# Patient Record
Sex: FEMALE | Race: ASIAN | URBAN - METROPOLITAN AREA
[De-identification: names, ages, dates, MRNs, and addresses within clinical notes are randomized per-mention and may not be internally consistent; named-entity substitution may affect disease eponyms.]

---

## 2018-09-20 PROBLEM — E78.5 DYSLIPIDEMIA: Status: ACTIVE | Noted: 2017-10-04

## 2018-09-20 PROBLEM — I44.7 LBBB (LEFT BUNDLE BRANCH BLOCK): Status: ACTIVE | Noted: 2017-11-06

## 2019-01-17 PROBLEM — B37.31 CANDIDA VAGINITIS: Status: ACTIVE | Noted: 2019-01-17

## 2019-01-17 PROBLEM — B37.3 CANDIDA VAGINITIS: Status: ACTIVE | Noted: 2019-01-17

## 2019-01-17 PROBLEM — E78.2 MIXED HYPERLIPIDEMIA: Status: ACTIVE | Noted: 2019-01-17

## 2019-01-17 PROBLEM — E11.9 CONTROLLED TYPE 2 DIABETES MELLITUS WITHOUT COMPLICATION, WITHOUT LONG-TERM CURRENT USE OF INSULIN (HCC): Status: ACTIVE | Noted: 2019-01-17

## 2020-03-16 ENCOUNTER — TELEPHONE (OUTPATIENT)
Dept: FAMILY MEDICINE CLINIC | Facility: CLINIC | Age: 65
End: 2020-03-16

## 2020-03-16 NOTE — TELEPHONE ENCOUNTER
Patient returned call not happy that she has to wait a month to be seen. Patient will need medication and would like to know if she can somehow get in sooner so that she is not without her medication.

## 2020-03-16 NOTE — TELEPHONE ENCOUNTER
Patient has never been seen here. Was scheduled for DM/thyroid/HTN visit/establish care. We are unable to refill her medication until she establishes with office.      Routed to clinical supervisor, Gerda Green RN

## 2022-09-07 ENCOUNTER — OFFICE VISIT (OUTPATIENT)
Dept: UROLOGY | Facility: CLINIC | Age: 67
End: 2022-09-07
Attending: OBSTETRICS & GYNECOLOGY
Payer: MEDICARE

## 2022-09-07 VITALS — HEIGHT: 66 IN | BODY MASS INDEX: 23.78 KG/M2 | TEMPERATURE: 97 F | WEIGHT: 148 LBS

## 2022-09-07 DIAGNOSIS — N81.6 RECTOCELE: ICD-10-CM

## 2022-09-07 DIAGNOSIS — N81.2 INCOMPLETE UTEROVAGINAL PROLAPSE: Primary | ICD-10-CM

## 2022-09-07 DIAGNOSIS — N95.2 VAGINAL ATROPHY: ICD-10-CM

## 2022-09-07 DIAGNOSIS — N81.11 CYSTOCELE, MIDLINE: ICD-10-CM

## 2022-09-07 DIAGNOSIS — N81.89 PELVIC FLOOR WEAKNESS: ICD-10-CM

## 2022-09-07 PROCEDURE — 99202 OFFICE O/P NEW SF 15 MIN: CPT

## 2022-09-19 ENCOUNTER — HOSPITAL ENCOUNTER (OUTPATIENT)
Dept: ULTRASOUND IMAGING | Age: 67
Discharge: HOME | End: 2022-09-19
Attending: INTERNAL MEDICINE

## 2022-09-19 ENCOUNTER — HOSPITAL ENCOUNTER (OUTPATIENT)
Dept: MAMMOGRAPHY | Age: 67
Discharge: HOME | End: 2022-09-19
Attending: INTERNAL MEDICINE

## 2022-09-19 DIAGNOSIS — N64.4 BREAST PAIN, LEFT: ICD-10-CM

## 2022-09-19 DIAGNOSIS — Z12.31 SCREENING MAMMOGRAM FOR BREAST CANCER: ICD-10-CM

## 2022-09-19 PROCEDURE — 77061 BREAST TOMOSYNTHESIS UNI: CPT | Performed by: INTERNAL MEDICINE

## 2022-09-19 PROCEDURE — 76642 ULTRASOUND BREAST LIMITED: CPT | Performed by: INTERNAL MEDICINE

## 2022-09-19 PROCEDURE — 77065 DX MAMMO INCL CAD UNI: CPT | Performed by: INTERNAL MEDICINE

## 2022-11-09 ENCOUNTER — TELEPHONE (OUTPATIENT)
Dept: UROLOGY | Facility: CLINIC | Age: 67
End: 2022-11-09

## 2022-11-09 NOTE — TELEPHONE ENCOUNTER
TC from pt requesting diagnostic/billing codes for upcoming UDS   Pt is switching insurance 1/1/23 to [facility] and wants to see if procedure coverage is better with insurance change.   Has UDS scheduled 11/16, will call to reschedule if she determines coverage is better otherwise will keep apt

## 2022-11-16 ENCOUNTER — OFFICE VISIT (OUTPATIENT)
Dept: UROLOGY | Facility: CLINIC | Age: 67
End: 2022-11-16
Attending: OBSTETRICS & GYNECOLOGY
Payer: MEDICARE

## 2022-11-16 VITALS — TEMPERATURE: 98 F | WEIGHT: 148 LBS | BODY MASS INDEX: 24 KG/M2 | RESPIRATION RATE: 16 BRPM

## 2022-11-16 DIAGNOSIS — N81.11 CYSTOCELE, MIDLINE: ICD-10-CM

## 2022-11-16 DIAGNOSIS — N81.6 RECTOCELE: ICD-10-CM

## 2022-11-16 DIAGNOSIS — N81.2 INCOMPLETE UTEROVAGINAL PROLAPSE: Primary | ICD-10-CM

## 2022-11-16 LAB
BLOOD URINE: NEGATIVE
CONTROL RUN WITHIN 24 HOURS?: YES
LEUKOCYTE ESTERASE URINE: NEGATIVE
NITRITE URINE: NEGATIVE

## 2022-11-16 PROCEDURE — 51784 ANAL/URINARY MUSCLE STUDY: CPT

## 2022-11-16 PROCEDURE — 51797 INTRAABDOMINAL PRESSURE TEST: CPT

## 2022-11-16 PROCEDURE — 51729 CYSTOMETROGRAM W/VP&UP: CPT

## 2022-11-16 PROCEDURE — 51741 ELECTRO-UROFLOWMETRY FIRST: CPT

## 2022-11-16 PROCEDURE — 81002 URINALYSIS NONAUTO W/O SCOPE: CPT

## 2022-11-16 NOTE — PATIENT INSTRUCTIONS
[facility] UROGYNECOLOGY  [address]  [address]: [phone]  FAX: [phone]       Urodynamic Testing Discharge Instructions: There are NO dietary or activity restrictions. You may resume your normal schedule. You may have mild discomfort for a few hours after your testing today. There may be some mild burning when you urinate or you may see some blood in your urine. These problems should not last more than 24 hours. The following suggestions may minimize any symptoms you experience. Drink 6-8 large glasses of water over the next 8 hours  A compress or sitz bath may be soothing  Tylenol or Ibuprofen may be taken as needed    If you experience any of the following, please call the office or, if after hours, the on-call physician at [phone]. Excessive pain  Bright red bloody discharge  Fever or chills  Continued urgency, frequency or burning with urination    Obtaining Test Results    Your urodynamic test will be interpreted by a specialist and available to the referring physician within 7-14 days. Patients in our clinic are given an appointment to come back to discuss the results and any appropriate treatment recommendations. Please do not hesitate to contact our office with any questions or concerns at [phone]. I acknowledge that I have received verbal and written discharge  instructions and that I understand these instructions clearly. Patient Signature:    Date:    [address]     Post-Operative Guidelines    AVOID CONSTIPATION - Take Miralax: one capful in water or juice each morning. You can also take each evening if needed. No lifting over 10 lbs. (1 gallon of milk is 8 lbs. )  It is okay to walk up and down stairs and to walk outside, but be careful not to become fatigued. Showers and tub baths are okay, even if you have a catheter or abdominal incision.   You may ride in a car immediately. You may drive after 1-2 weeks. Please call us for any of the following:  Temperature above 100.5 for 4 hours or above 101.0 at any time  Chest pain or trouble breathing  Vaginal bleeding heavier than a period  Redness, tenderness or swelling of your legs  Pain or burning when you urinate  Redness, pain or foul discharge from incision          [facility]    What is a urinary catheter? A catheter is a soft tube used to drain urine from your bladder. Why do I need a catheter? A urinary catheter is used to help with healing immediately after surgery. It works to keep your bladder empty while you are recovering. Surgery, swelling and anesthesia can cause the bladder to lose its normal sensations for a while. The catheter may stay in place for a week or more after you go home. Where will I go to get the catheter removed? Your catheter will be removed in the office. Please call the office to schedule a voiding trial with the nurse. How will the catheter be removed? A nurse will disconnect your catheter from the drainage bag. She will attach a syringe to the catheter and fill the bladder with sterile water until you have a strong desire to urinate. The catheter will be removed and you will be able to go to the bathroom to empty your bladder. Will the catheter need to be replaced? You will need to urinate a specific amount, depending on how much you were initially able to hold. Your catheter will only need to be replaced if you are not able to empty the specific amount. If the catheter is replaced, your nurse will discuss with you when you need to return. What do I do after the catheter is removed? For the first 24 hours, you should go to the bathroom with your first urge or every 2-3 hours while you are awake.   Urinate before you go to bed and if you wake up in the night, you do not need to set an alarm to wake up. Drink plenty of water (6-8 glasses) slowly throughout the day. Avoid liquids containing caffeine. Continue with any pain medications (Motrin) as directed by the nurse. Continue with your current bowel regime; avoid constipation. What if I have trouble emptying my bladder? If you are having trouble emptying your bladder, try the following tips:  Urinate normally then stand up, walk around for a minute or two, sit back down on the commode and attempt to urinate (double void). Sit in a tub of warm water and try to urinate in the tub. Run warm water over your vaginal area while attempting to urinate. When should I call the office? If you are unable to urinate for 6 hours. You feel you need to urinate but cannot. Urinating frequently in small amounts. You experience abdominal bloating, pressure or back pain. You have a fever over 100.5 for 4 hours or above 101.0 anytime. You have nausea and/or vomiting. Burning/pain with urination. Please feel free to call the nurse at [phone] with any questions 8am-4:30pm Monday-Friday. If the office is closed, you may call the on call physician at [phone] or go to the emergency room.

## 2022-11-30 ENCOUNTER — OFFICE VISIT (OUTPATIENT)
Dept: UROLOGY | Facility: CLINIC | Age: 67
End: 2022-11-30
Attending: OBSTETRICS & GYNECOLOGY
Payer: MEDICARE

## 2022-11-30 VITALS — HEIGHT: 66 IN | WEIGHT: 148 LBS | TEMPERATURE: 97 F | BODY MASS INDEX: 23.78 KG/M2

## 2022-11-30 DIAGNOSIS — N39.3 URINARY, INCONTINENCE, STRESS FEMALE: ICD-10-CM

## 2022-11-30 DIAGNOSIS — N81.6 RECTOCELE: ICD-10-CM

## 2022-11-30 DIAGNOSIS — N81.11 CYSTOCELE, MIDLINE: ICD-10-CM

## 2022-11-30 DIAGNOSIS — N81.2 INCOMPLETE UTEROVAGINAL PROLAPSE: Primary | ICD-10-CM

## 2022-11-30 PROCEDURE — 99212 OFFICE O/P EST SF 10 MIN: CPT

## 2023-01-11 ENCOUNTER — TELEPHONE (OUTPATIENT)
Dept: UROLOGY | Facility: CLINIC | Age: 68
End: 2023-01-11

## 2023-01-11 NOTE — TELEPHONE ENCOUNTER
TC from pt who has inquired about upcoming (1/26/23) TVH, BS, USLS, APER with mus/cysto. Pt wants to know why her ovaries are not being removed and has requested this RN to send message to Dr Nadir Ye for clarification. Discussed benefits of keeping ovaries, for coronary health but pt adamant she'd like them removed as well. Also inquiring about possibility of home health care for zhao care. Discussed what her expectations should be postoperatively and reassured her that RN staff at hospital will review zhao care after surgery with herself and spouse.   No further questions at this time  Referenced surgical packet for bowel regimen and overall home care

## 2023-01-13 ENCOUNTER — LAB ENCOUNTER (OUTPATIENT)
Dept: LAB | Age: 68
End: 2023-01-13
Attending: INTERNAL MEDICINE
Payer: MEDICARE

## 2023-01-13 ENCOUNTER — EKG ENCOUNTER (OUTPATIENT)
Dept: LAB | Age: 68
End: 2023-01-13
Attending: INTERNAL MEDICINE
Payer: MEDICARE

## 2023-01-13 DIAGNOSIS — Z01.818 PREOPERATIVE EXAMINATION, UNSPECIFIED: ICD-10-CM

## 2023-01-13 LAB
ALBUMIN SERPL-MCNC: 3.8 G/DL (ref 3.4–5)
ALBUMIN/GLOB SERPL: 1 {RATIO} (ref 1–2)
ALP LIVER SERPL-CCNC: 61 U/L
ALT SERPL-CCNC: 20 U/L
ANION GAP SERPL CALC-SCNC: 3 MMOL/L (ref 0–18)
AST SERPL-CCNC: 14 U/L (ref 15–37)
ATRIAL RATE: 52 BPM
BASOPHILS # BLD AUTO: 0.05 X10(3) UL (ref 0–0.2)
BASOPHILS NFR BLD AUTO: 0.8 %
BILIRUB SERPL-MCNC: 0.3 MG/DL (ref 0.1–2)
BUN BLD-MCNC: 17 MG/DL (ref 7–18)
CALCIUM BLD-MCNC: 9.6 MG/DL (ref 8.5–10.1)
CHLORIDE SERPL-SCNC: 108 MMOL/L (ref 98–112)
CO2 SERPL-SCNC: 29 MMOL/L (ref 21–32)
CREAT BLD-MCNC: 0.56 MG/DL
EOSINOPHIL # BLD AUTO: 0.24 X10(3) UL (ref 0–0.7)
EOSINOPHIL NFR BLD AUTO: 3.7 %
ERYTHROCYTE [DISTWIDTH] IN BLOOD BY AUTOMATED COUNT: 12.7 %
FASTING STATUS PATIENT QL REPORTED: NO
GFR SERPLBLD BASED ON 1.73 SQ M-ARVRAT: 100 ML/MIN/1.73M2 (ref 60–?)
GLOBULIN PLAS-MCNC: 3.9 G/DL (ref 2.8–4.4)
GLUCOSE BLD-MCNC: 116 MG/DL (ref 70–99)
HCT VFR BLD AUTO: 40.7 %
HGB BLD-MCNC: 13.5 G/DL
IMM GRANULOCYTES # BLD AUTO: 0.02 X10(3) UL (ref 0–1)
IMM GRANULOCYTES NFR BLD: 0.3 %
LYMPHOCYTES # BLD AUTO: 2.39 X10(3) UL (ref 1–4)
LYMPHOCYTES NFR BLD AUTO: 36.6 %
MCH RBC QN AUTO: 30.7 PG (ref 26–34)
MCHC RBC AUTO-ENTMCNC: 33.2 G/DL (ref 31–37)
MCV RBC AUTO: 92.5 FL
MONOCYTES # BLD AUTO: 0.7 X10(3) UL (ref 0.1–1)
MONOCYTES NFR BLD AUTO: 10.7 %
NEUTROPHILS # BLD AUTO: 3.13 X10 (3) UL (ref 1.5–7.7)
NEUTROPHILS # BLD AUTO: 3.13 X10(3) UL (ref 1.5–7.7)
NEUTROPHILS NFR BLD AUTO: 47.9 %
OSMOLALITY SERPL CALC.SUM OF ELEC: 293 MOSM/KG (ref 275–295)
P AXIS: 38 DEGREES
P-R INTERVAL: 178 MS
PLATELET # BLD AUTO: 365 10(3)UL (ref 150–450)
POTASSIUM SERPL-SCNC: 4.9 MMOL/L (ref 3.5–5.1)
PROT SERPL-MCNC: 7.7 G/DL (ref 6.4–8.2)
Q-T INTERVAL: 482 MS
QRS DURATION: 138 MS
QTC CALCULATION (BEZET): 448 MS
R AXIS: -31 DEGREES
RBC # BLD AUTO: 4.4 X10(6)UL
SODIUM SERPL-SCNC: 140 MMOL/L (ref 136–145)
T AXIS: 83 DEGREES
VENTRICULAR RATE: 52 BPM
WBC # BLD AUTO: 6.5 X10(3) UL (ref 4–11)

## 2023-01-13 PROCEDURE — 80053 COMPREHEN METABOLIC PANEL: CPT

## 2023-01-13 PROCEDURE — 93010 ELECTROCARDIOGRAM REPORT: CPT | Performed by: INTERNAL MEDICINE

## 2023-01-13 PROCEDURE — 93005 ELECTROCARDIOGRAM TRACING: CPT

## 2023-01-13 PROCEDURE — 85025 COMPLETE CBC W/AUTO DIFF WBC: CPT

## 2023-01-13 PROCEDURE — 36415 COLL VENOUS BLD VENIPUNCTURE: CPT

## 2023-01-13 NOTE — PROGRESS NOTES
I will discuss results with the patient in person at the next visit- Appt scheduled - date verified by me.  [place],

## 2023-01-23 ENCOUNTER — LAB ENCOUNTER (OUTPATIENT)
Dept: LAB | Age: 68
End: 2023-01-23
Attending: OBSTETRICS & GYNECOLOGY
Payer: MEDICARE

## 2023-01-23 DIAGNOSIS — Z01.818 PRE-OP TESTING: ICD-10-CM

## 2023-01-24 LAB — SARS-COV-2 RNA RESP QL NAA+PROBE: NOT DETECTED

## 2023-01-25 ENCOUNTER — ANESTHESIA EVENT (OUTPATIENT)
Dept: SURGERY | Facility: HOSPITAL | Age: 68
End: 2023-01-25
Payer: MEDICARE

## 2023-01-26 ENCOUNTER — HOSPITAL ENCOUNTER (OUTPATIENT)
Facility: HOSPITAL | Age: 68
Discharge: HOME HEALTH | End: 2023-01-27
Attending: OBSTETRICS & GYNECOLOGY | Admitting: OBSTETRICS & GYNECOLOGY
Payer: MEDICARE

## 2023-01-26 ENCOUNTER — ANESTHESIA (OUTPATIENT)
Dept: SURGERY | Facility: HOSPITAL | Age: 68
End: 2023-01-26
Payer: MEDICARE

## 2023-01-26 DIAGNOSIS — N81.4 UTEROVAGINAL PROLAPSE: ICD-10-CM

## 2023-01-26 DIAGNOSIS — N39.3 STRESS INCONTINENCE: ICD-10-CM

## 2023-01-26 DIAGNOSIS — Z01.818 PRE-OP TESTING: Primary | ICD-10-CM

## 2023-01-26 DIAGNOSIS — N81.1 CYSTOCELE: ICD-10-CM

## 2023-01-26 DIAGNOSIS — N81.6 RECTOCELE: ICD-10-CM

## 2023-01-26 DIAGNOSIS — D25.9 UTERINE FIBROID: ICD-10-CM

## 2023-01-26 DIAGNOSIS — IMO0002 CYSTOCELE: ICD-10-CM

## 2023-01-26 LAB
GLUCOSE BLD-MCNC: 119 MG/DL (ref 70–99)
GLUCOSE BLD-MCNC: 205 MG/DL (ref 70–99)
GLUCOSE BLD-MCNC: 207 MG/DL (ref 70–99)
GLUCOSE BLD-MCNC: 225 MG/DL (ref 70–99)

## 2023-01-26 PROCEDURE — 0UT97ZZ RESECTION OF UTERUS, VIA NATURAL OR ARTIFICIAL OPENING: ICD-10-PCS | Performed by: OBSTETRICS & GYNECOLOGY

## 2023-01-26 PROCEDURE — 0JQC0ZZ REPAIR PELVIC REGION SUBCUTANEOUS TISSUE AND FASCIA, OPEN APPROACH: ICD-10-PCS | Performed by: OBSTETRICS & GYNECOLOGY

## 2023-01-26 PROCEDURE — 93010 ELECTROCARDIOGRAM REPORT: CPT | Performed by: INTERNAL MEDICINE

## 2023-01-26 PROCEDURE — 88305 TISSUE EXAM BY PATHOLOGIST: CPT | Performed by: OBSTETRICS & GYNECOLOGY

## 2023-01-26 PROCEDURE — 93005 ELECTROCARDIOGRAM TRACING: CPT

## 2023-01-26 PROCEDURE — 0UQF0ZZ REPAIR CUL-DE-SAC, OPEN APPROACH: ICD-10-PCS | Performed by: OBSTETRICS & GYNECOLOGY

## 2023-01-26 PROCEDURE — 0UT27ZZ RESECTION OF BILATERAL OVARIES, VIA NATURAL OR ARTIFICIAL OPENING: ICD-10-PCS | Performed by: OBSTETRICS & GYNECOLOGY

## 2023-01-26 PROCEDURE — 0TSD0ZZ REPOSITION URETHRA, OPEN APPROACH: ICD-10-PCS | Performed by: OBSTETRICS & GYNECOLOGY

## 2023-01-26 PROCEDURE — 0UT77ZZ RESECTION OF BILATERAL FALLOPIAN TUBES, VIA NATURAL OR ARTIFICIAL OPENING: ICD-10-PCS | Performed by: OBSTETRICS & GYNECOLOGY

## 2023-01-26 PROCEDURE — 82962 GLUCOSE BLOOD TEST: CPT

## 2023-01-26 DEVICE — TRANSVAGINAL MID-URETHRAL SLING
Type: IMPLANTABLE DEVICE | Site: URETHRA | Status: FUNCTIONAL
Brand: ADVANTAGE FIT™  SYSTEM

## 2023-01-26 RX ORDER — LEVOTHYROXINE SODIUM 112 UG/1
112 TABLET ORAL
COMMUNITY

## 2023-01-26 RX ORDER — NEOSTIGMINE METHYLSULFATE 1 MG/ML
INJECTION, SOLUTION INTRAVENOUS AS NEEDED
Status: DISCONTINUED | OUTPATIENT
Start: 2023-01-26 | End: 2023-01-26 | Stop reason: SURG

## 2023-01-26 RX ORDER — KETOROLAC TROMETHAMINE 30 MG/ML
INJECTION, SOLUTION INTRAMUSCULAR; INTRAVENOUS AS NEEDED
Status: DISCONTINUED | OUTPATIENT
Start: 2023-01-26 | End: 2023-01-26 | Stop reason: SURG

## 2023-01-26 RX ORDER — KETOROLAC TROMETHAMINE 15 MG/ML
15 INJECTION, SOLUTION INTRAMUSCULAR; INTRAVENOUS EVERY 6 HOURS
Status: COMPLETED | OUTPATIENT
Start: 2023-01-26 | End: 2023-01-27

## 2023-01-26 RX ORDER — OXYCODONE HYDROCHLORIDE 5 MG/1
2.5 TABLET ORAL EVERY 4 HOURS PRN
Status: DISCONTINUED | OUTPATIENT
Start: 2023-01-26 | End: 2023-01-27

## 2023-01-26 RX ORDER — ACETAMINOPHEN 500 MG
1000 TABLET ORAL ONCE AS NEEDED
Status: DISCONTINUED | OUTPATIENT
Start: 2023-01-26 | End: 2023-01-26 | Stop reason: HOSPADM

## 2023-01-26 RX ORDER — ROCURONIUM BROMIDE 10 MG/ML
INJECTION, SOLUTION INTRAVENOUS AS NEEDED
Status: DISCONTINUED | OUTPATIENT
Start: 2023-01-26 | End: 2023-01-26 | Stop reason: SURG

## 2023-01-26 RX ORDER — NICOTINE POLACRILEX 4 MG
15 LOZENGE BUCCAL
Status: DISCONTINUED | OUTPATIENT
Start: 2023-01-26 | End: 2023-01-26 | Stop reason: HOSPADM

## 2023-01-26 RX ORDER — DEXTROSE MONOHYDRATE 25 G/50ML
50 INJECTION, SOLUTION INTRAVENOUS
Status: DISCONTINUED | OUTPATIENT
Start: 2023-01-26 | End: 2023-01-27

## 2023-01-26 RX ORDER — BUPIVACAINE HYDROCHLORIDE AND EPINEPHRINE 2.5; 5 MG/ML; UG/ML
INJECTION, SOLUTION EPIDURAL; INFILTRATION; INTRACAUDAL; PERINEURAL AS NEEDED
Status: DISCONTINUED | OUTPATIENT
Start: 2023-01-26 | End: 2023-01-26 | Stop reason: HOSPADM

## 2023-01-26 RX ORDER — LOSARTAN POTASSIUM 100 MG/1
100 TABLET ORAL DAILY
Status: DISCONTINUED | OUTPATIENT
Start: 2023-01-27 | End: 2023-01-27

## 2023-01-26 RX ORDER — HYDROCHLOROTHIAZIDE 25 MG/1
25 TABLET ORAL DAILY
COMMUNITY

## 2023-01-26 RX ORDER — HYDROMORPHONE HYDROCHLORIDE 1 MG/ML
0.4 INJECTION, SOLUTION INTRAMUSCULAR; INTRAVENOUS; SUBCUTANEOUS EVERY 2 HOUR PRN
Status: DISCONTINUED | OUTPATIENT
Start: 2023-01-26 | End: 2023-01-27

## 2023-01-26 RX ORDER — HYDROMORPHONE HYDROCHLORIDE 1 MG/ML
0.2 INJECTION, SOLUTION INTRAMUSCULAR; INTRAVENOUS; SUBCUTANEOUS EVERY 5 MIN PRN
Status: DISCONTINUED | OUTPATIENT
Start: 2023-01-26 | End: 2023-01-26 | Stop reason: HOSPADM

## 2023-01-26 RX ORDER — CEFAZOLIN SODIUM/WATER 2 G/20 ML
2 SYRINGE (ML) INTRAVENOUS ONCE
Status: COMPLETED | OUTPATIENT
Start: 2023-01-26 | End: 2023-01-26

## 2023-01-26 RX ORDER — HYDROMORPHONE HYDROCHLORIDE 1 MG/ML
0.6 INJECTION, SOLUTION INTRAMUSCULAR; INTRAVENOUS; SUBCUTANEOUS EVERY 5 MIN PRN
Status: DISCONTINUED | OUTPATIENT
Start: 2023-01-26 | End: 2023-01-26 | Stop reason: HOSPADM

## 2023-01-26 RX ORDER — ENOXAPARIN SODIUM 100 MG/ML
40 INJECTION SUBCUTANEOUS DAILY
Status: DISCONTINUED | OUTPATIENT
Start: 2023-01-27 | End: 2023-01-27

## 2023-01-26 RX ORDER — SODIUM CHLORIDE, SODIUM LACTATE, POTASSIUM CHLORIDE, CALCIUM CHLORIDE 600; 310; 30; 20 MG/100ML; MG/100ML; MG/100ML; MG/100ML
INJECTION, SOLUTION INTRAVENOUS CONTINUOUS
Status: DISCONTINUED | OUTPATIENT
Start: 2023-01-26 | End: 2023-01-27

## 2023-01-26 RX ORDER — IBUPROFEN 600 MG/1
600 TABLET ORAL EVERY 6 HOURS SCHEDULED
Status: DISCONTINUED | OUTPATIENT
Start: 2023-01-27 | End: 2023-01-27

## 2023-01-26 RX ORDER — GLYCOPYRROLATE 0.2 MG/ML
INJECTION, SOLUTION INTRAMUSCULAR; INTRAVENOUS AS NEEDED
Status: DISCONTINUED | OUTPATIENT
Start: 2023-01-26 | End: 2023-01-26 | Stop reason: SURG

## 2023-01-26 RX ORDER — NICOTINE POLACRILEX 4 MG
30 LOZENGE BUCCAL
Status: DISCONTINUED | OUTPATIENT
Start: 2023-01-26 | End: 2023-01-26 | Stop reason: HOSPADM

## 2023-01-26 RX ORDER — ONDANSETRON 2 MG/ML
INJECTION INTRAMUSCULAR; INTRAVENOUS AS NEEDED
Status: DISCONTINUED | OUTPATIENT
Start: 2023-01-26 | End: 2023-01-26 | Stop reason: SURG

## 2023-01-26 RX ORDER — MEPERIDINE HYDROCHLORIDE 25 MG/ML
25 INJECTION INTRAMUSCULAR; INTRAVENOUS; SUBCUTANEOUS
Status: DISCONTINUED | OUTPATIENT
Start: 2023-01-26 | End: 2023-01-26 | Stop reason: HOSPADM

## 2023-01-26 RX ORDER — HYDROCODONE BITARTRATE AND ACETAMINOPHEN 5; 325 MG/1; MG/1
2 TABLET ORAL ONCE AS NEEDED
Status: DISCONTINUED | OUTPATIENT
Start: 2023-01-26 | End: 2023-01-26 | Stop reason: HOSPADM

## 2023-01-26 RX ORDER — LEVOTHYROXINE SODIUM 112 UG/1
112 TABLET ORAL
Status: DISCONTINUED | OUTPATIENT
Start: 2023-01-27 | End: 2023-01-27

## 2023-01-26 RX ORDER — NICOTINE POLACRILEX 4 MG
15 LOZENGE BUCCAL
Status: DISCONTINUED | OUTPATIENT
Start: 2023-01-26 | End: 2023-01-27

## 2023-01-26 RX ORDER — ONDANSETRON 2 MG/ML
4 INJECTION INTRAMUSCULAR; INTRAVENOUS EVERY 8 HOURS PRN
Status: DISCONTINUED | OUTPATIENT
Start: 2023-01-26 | End: 2023-01-27

## 2023-01-26 RX ORDER — DEXTROSE MONOHYDRATE 25 G/50ML
50 INJECTION, SOLUTION INTRAVENOUS
Status: DISCONTINUED | OUTPATIENT
Start: 2023-01-26 | End: 2023-01-26 | Stop reason: HOSPADM

## 2023-01-26 RX ORDER — DIPHENHYDRAMINE HYDROCHLORIDE 50 MG/ML
12.5 INJECTION INTRAMUSCULAR; INTRAVENOUS EVERY 4 HOURS PRN
Status: DISCONTINUED | OUTPATIENT
Start: 2023-01-26 | End: 2023-01-27

## 2023-01-26 RX ORDER — OXYCODONE HYDROCHLORIDE 5 MG/1
5 TABLET ORAL EVERY 4 HOURS PRN
Status: DISCONTINUED | OUTPATIENT
Start: 2023-01-26 | End: 2023-01-27

## 2023-01-26 RX ORDER — HYDROMORPHONE HYDROCHLORIDE 1 MG/ML
INJECTION, SOLUTION INTRAMUSCULAR; INTRAVENOUS; SUBCUTANEOUS
Status: COMPLETED
Start: 2023-01-26 | End: 2023-01-26

## 2023-01-26 RX ORDER — NALOXONE HYDROCHLORIDE 0.4 MG/ML
80 INJECTION, SOLUTION INTRAMUSCULAR; INTRAVENOUS; SUBCUTANEOUS AS NEEDED
Status: DISCONTINUED | OUTPATIENT
Start: 2023-01-26 | End: 2023-01-26 | Stop reason: HOSPADM

## 2023-01-26 RX ORDER — NICOTINE POLACRILEX 4 MG
30 LOZENGE BUCCAL
Status: DISCONTINUED | OUTPATIENT
Start: 2023-01-26 | End: 2023-01-27

## 2023-01-26 RX ORDER — ONDANSETRON 4 MG/1
4 TABLET, FILM COATED ORAL EVERY 8 HOURS PRN
Status: DISCONTINUED | OUTPATIENT
Start: 2023-01-26 | End: 2023-01-27

## 2023-01-26 RX ORDER — LOSARTAN POTASSIUM 100 MG/1
100 TABLET ORAL DAILY
COMMUNITY

## 2023-01-26 RX ORDER — PHENAZOPYRIDINE HYDROCHLORIDE 200 MG/1
200 TABLET, FILM COATED ORAL ONCE
Status: COMPLETED | OUTPATIENT
Start: 2023-01-26 | End: 2023-01-26

## 2023-01-26 RX ORDER — HYDROCODONE BITARTRATE AND ACETAMINOPHEN 5; 325 MG/1; MG/1
1 TABLET ORAL ONCE AS NEEDED
Status: DISCONTINUED | OUTPATIENT
Start: 2023-01-26 | End: 2023-01-26 | Stop reason: HOSPADM

## 2023-01-26 RX ORDER — HYDROCHLOROTHIAZIDE 25 MG/1
25 TABLET ORAL DAILY
Status: DISCONTINUED | OUTPATIENT
Start: 2023-01-27 | End: 2023-01-27

## 2023-01-26 RX ORDER — GLYCOPYRROLATE 0.2 MG/ML
INJECTION, SOLUTION INTRAMUSCULAR; INTRAVENOUS
Status: COMPLETED
Start: 2023-01-26 | End: 2023-01-26

## 2023-01-26 RX ORDER — ACETAMINOPHEN 325 MG/1
650 TABLET ORAL ONCE
Status: DISCONTINUED | OUTPATIENT
Start: 2023-01-26 | End: 2023-01-26 | Stop reason: HOSPADM

## 2023-01-26 RX ORDER — ACETAMINOPHEN 500 MG
1000 TABLET ORAL ONCE
Status: DISCONTINUED | OUTPATIENT
Start: 2023-01-26 | End: 2023-01-26 | Stop reason: HOSPADM

## 2023-01-26 RX ORDER — HYDROMORPHONE HYDROCHLORIDE 1 MG/ML
0.4 INJECTION, SOLUTION INTRAMUSCULAR; INTRAVENOUS; SUBCUTANEOUS EVERY 5 MIN PRN
Status: DISCONTINUED | OUTPATIENT
Start: 2023-01-26 | End: 2023-01-26 | Stop reason: HOSPADM

## 2023-01-26 RX ORDER — HYDROMORPHONE HYDROCHLORIDE 1 MG/ML
0.2 INJECTION, SOLUTION INTRAMUSCULAR; INTRAVENOUS; SUBCUTANEOUS EVERY 2 HOUR PRN
Status: DISCONTINUED | OUTPATIENT
Start: 2023-01-26 | End: 2023-01-27

## 2023-01-26 RX ORDER — GLIPIZIDE 5 MG/1
5 TABLET, FILM COATED, EXTENDED RELEASE ORAL DAILY
Status: DISCONTINUED | OUTPATIENT
Start: 2023-01-26 | End: 2023-01-26

## 2023-01-26 RX ORDER — GLIPIZIDE 5 MG/1
5 TABLET, FILM COATED, EXTENDED RELEASE ORAL DAILY
COMMUNITY

## 2023-01-26 RX ORDER — SODIUM CHLORIDE, SODIUM LACTATE, POTASSIUM CHLORIDE, CALCIUM CHLORIDE 600; 310; 30; 20 MG/100ML; MG/100ML; MG/100ML; MG/100ML
INJECTION, SOLUTION INTRAVENOUS CONTINUOUS
Status: DISCONTINUED | OUTPATIENT
Start: 2023-01-26 | End: 2023-01-26 | Stop reason: HOSPADM

## 2023-01-26 RX ORDER — METOCLOPRAMIDE HYDROCHLORIDE 5 MG/ML
INJECTION INTRAMUSCULAR; INTRAVENOUS AS NEEDED
Status: DISCONTINUED | OUTPATIENT
Start: 2023-01-26 | End: 2023-01-26 | Stop reason: SURG

## 2023-01-26 RX ORDER — GLYCOPYRROLATE 0.2 MG/ML
0.4 INJECTION, SOLUTION INTRAMUSCULAR; INTRAVENOUS ONCE
Status: COMPLETED | OUTPATIENT
Start: 2023-01-26 | End: 2023-01-26

## 2023-01-26 RX ORDER — MIDAZOLAM HYDROCHLORIDE 1 MG/ML
1 INJECTION INTRAMUSCULAR; INTRAVENOUS EVERY 5 MIN PRN
Status: DISCONTINUED | OUTPATIENT
Start: 2023-01-26 | End: 2023-01-26 | Stop reason: HOSPADM

## 2023-01-26 RX ORDER — ONDANSETRON 2 MG/ML
4 INJECTION INTRAMUSCULAR; INTRAVENOUS EVERY 6 HOURS PRN
Status: DISCONTINUED | OUTPATIENT
Start: 2023-01-26 | End: 2023-01-26 | Stop reason: HOSPADM

## 2023-01-26 RX ADMIN — KETOROLAC TROMETHAMINE 30 MG: 30 INJECTION, SOLUTION INTRAMUSCULAR; INTRAVENOUS at 15:15:00

## 2023-01-26 RX ADMIN — GLYCOPYRROLATE 0.4 MG: 0.2 INJECTION, SOLUTION INTRAMUSCULAR; INTRAVENOUS at 13:35:00

## 2023-01-26 RX ADMIN — ONDANSETRON 4 MG: 2 INJECTION INTRAMUSCULAR; INTRAVENOUS at 15:15:00

## 2023-01-26 RX ADMIN — GLYCOPYRROLATE 0.4 MG: 0.2 INJECTION, SOLUTION INTRAMUSCULAR; INTRAVENOUS at 15:15:00

## 2023-01-26 RX ADMIN — ROCURONIUM BROMIDE 50 MG: 10 INJECTION, SOLUTION INTRAVENOUS at 12:58:00

## 2023-01-26 RX ADMIN — METOCLOPRAMIDE HYDROCHLORIDE 10 MG: 5 INJECTION INTRAMUSCULAR; INTRAVENOUS at 13:40:00

## 2023-01-26 RX ADMIN — NEOSTIGMINE METHYLSULFATE 3 MG: 1 INJECTION, SOLUTION INTRAVENOUS at 15:15:00

## 2023-01-26 RX ADMIN — CEFAZOLIN SODIUM/WATER 2 G: 2 G/20 ML SYRINGE (ML) INTRAVENOUS at 12:57:00

## 2023-01-26 NOTE — INTERVAL H&P NOTE
Pre-op Diagnosis: CYSTOCELE; RECTOCELE; STRESS INCONTINENCE; UTEROVAGINAL PROLAPSE    The above referenced H&P was reviewed by Zechariah Mack MD on 1/26/2023, the patient was examined and no significant changes have occurred in the patient's condition since the H&P was performed. I discussed with the patient and/or legal representative the potential benefits, risks and side effects of this procedure; the likelihood of the patient achieving goals; and potential problems that might occur during recuperation. I discussed reasonable alternatives to the procedure, including risks, benefits and side effects related to the alternatives and risks related to not receiving this procedure. We will proceed with procedure as planned.

## 2023-01-26 NOTE — BRIEF OP NOTE
Pre-Operative Diagnosis: CYSTOCELE; RECTOCELE; STRESS INCONTINENCE; UTEROVAGINAL PROLAPSE     Post-Operative Diagnosis: CYSTOCELE; RECTOCELE; STRESS INCONTINENCE; UTEROVAGINAL PROLAPSE; UTERINE FIBROID      Procedure Performed:   TOTAL VAGINAL HYSTERECTOMY, BILATERAL SALPINGO-OOPHORECTOMY, UTEROSACRAL LIGAMENT SUSPENSION, ANTERIOR POSTERIOR ENTEROCELE REPAIR, PLACEMENT OF MID-URETHRAL SLING, CYSTOSCOPY    Surgeon(s) and Role:     Xavier Saavedra MD - Primary    Assistant(s):  Surgical Assistant.: Raghavendra Granados     Surgical Findings: Fibroid uterus. Stage 2 uteovaginal prolapse, cystocele, rectocele. Normal bladder, urethra. Specimen: Uterus, cervix, right and left tubes and ovaries.       Estimated Blood Loss: Blood Output: 100 mL (1/26/2023  2:56 PM)      Liliam Meneses MD  1/26/2023  3:31 PM

## 2023-01-26 NOTE — DISCHARGE INSTRUCTIONS
Pelvic Medicine Postoperative Instructions:    PAIN CONTROL  Take Ibuprofen every 6 hours on a schedule. If you need more pain medication, you can take Norco - these are also taken every 6 hours, but you should take one of these in between your doses of ibuprofen (so that you are getting pain medication every 3 hours, but alternating between ibuprofen and Norco)    1. AVOID CONSTIPATION:   -Take Miralax one capful in water or juice each morning.    -Take Fiber supplement along with Miralax as well. These can be mixed together in the same glass of liquid.  -On any day that you don't have a bowel movement, you can take 1-2 teaspoons of Milk of Magnesia that evening   2. No lifting over 10 pounds (1 gallon of milk is 8 pounds). 3. Showers and tub baths are okay, even if you have a catheter or abdominal incision. 4. You may ride in a car immediately. 5. You may drive after 1-2 weeks as long as you are not taking any narcotic pain medications    Please call us for any of the following:  -Temperature above 100.5 for 4 hours or above 101.0 at any time.  -Chest pain or trouble breathing.  -Vaginal bleeding heavier than a period.  -Redness, tenderness or swelling of your legs  -Pain or burning when you urinate; or if you go home with a catheter, trouble with catheter draining.  -Redness, pain or foul discharge from incision. Office telephone: [phone]  After hours: [phone]     Sometimes managing your health at home requires assistance. The [facility] team has recognized your preference to use [facility]. They can be reached by phone at [phone]. The fax number for your reference is [phone]. A representative from the home health agency will contact you or your family to schedule your first visit.

## 2023-01-26 NOTE — ANESTHESIA PROCEDURE NOTES
Airway  Date/Time: 1/26/2023 12:59 PM  Urgency: elective    Airway not difficult    General Information and Staff    Patient location during procedure: OR  Anesthesiologist: Margot Kline MD  Performed: anesthesiologist     Indications and Patient Condition  Indications for airway management: anesthesia  Spontaneous Ventilation: absent  Sedation level: deep  Preoxygenated: yes  Patient position: sniffing  Mask difficulty assessment: 1 - vent by mask    Final Airway Details  Final airway type: endotracheal airway      Successful airway: ETT  Cuffed: yes   Successful intubation technique: direct laryngoscopy  Facilitating devices/methods: intubating stylet  Endotracheal tube insertion site: oral  Blade: Flavio  Blade size: #3  ETT size (mm): 7.0    Cormack-Lehane Classification: grade IIB - view of arytenoids or posterior of glottis only  Placement verified by: chest auscultation and capnometry   Cuff volume (mL): 8  Measured from: teeth  ETT to teeth (cm): 25  Number of attempts at approach: 1  Ventilation between attempts: none  Number of other approaches attempted: 0

## 2023-01-27 VITALS
RESPIRATION RATE: 16 BRPM | TEMPERATURE: 99 F | HEART RATE: 59 BPM | SYSTOLIC BLOOD PRESSURE: 137 MMHG | WEIGHT: 148 LBS | HEIGHT: 66 IN | BODY MASS INDEX: 23.78 KG/M2 | DIASTOLIC BLOOD PRESSURE: 55 MMHG | OXYGEN SATURATION: 98 %

## 2023-01-27 LAB
ANION GAP SERPL CALC-SCNC: 4 MMOL/L (ref 0–18)
ATRIAL RATE: 66 BPM
BUN BLD-MCNC: 12 MG/DL (ref 7–18)
CALCIUM BLD-MCNC: 8.9 MG/DL (ref 8.5–10.1)
CHLORIDE SERPL-SCNC: 105 MMOL/L (ref 98–112)
CO2 SERPL-SCNC: 29 MMOL/L (ref 21–32)
CREAT BLD-MCNC: 0.54 MG/DL
ERYTHROCYTE [DISTWIDTH] IN BLOOD BY AUTOMATED COUNT: 12.2 %
GFR SERPLBLD BASED ON 1.73 SQ M-ARVRAT: 101 ML/MIN/1.73M2 (ref 60–?)
GLUCOSE BLD-MCNC: 118 MG/DL (ref 70–99)
GLUCOSE BLD-MCNC: 128 MG/DL (ref 70–99)
GLUCOSE BLD-MCNC: 164 MG/DL (ref 70–99)
HCT VFR BLD AUTO: 33.3 %
HGB BLD-MCNC: 11 G/DL
MCH RBC QN AUTO: 29.9 PG (ref 26–34)
MCHC RBC AUTO-ENTMCNC: 33 G/DL (ref 31–37)
MCV RBC AUTO: 90.5 FL
OSMOLALITY SERPL CALC.SUM OF ELEC: 287 MOSM/KG (ref 275–295)
P AXIS: 51 DEGREES
P-R INTERVAL: 196 MS
PLATELET # BLD AUTO: 255 10(3)UL (ref 150–450)
POTASSIUM SERPL-SCNC: 3.8 MMOL/L (ref 3.5–5.1)
Q-T INTERVAL: 468 MS
QRS DURATION: 148 MS
QTC CALCULATION (BEZET): 490 MS
R AXIS: -35 DEGREES
RBC # BLD AUTO: 3.68 X10(6)UL
SODIUM SERPL-SCNC: 138 MMOL/L (ref 136–145)
T AXIS: 103 DEGREES
VENTRICULAR RATE: 66 BPM
WBC # BLD AUTO: 10.6 X10(3) UL (ref 4–11)

## 2023-01-27 PROCEDURE — 82962 GLUCOSE BLOOD TEST: CPT

## 2023-01-27 PROCEDURE — 80048 BASIC METABOLIC PNL TOTAL CA: CPT | Performed by: OBSTETRICS & GYNECOLOGY

## 2023-01-27 PROCEDURE — 85027 COMPLETE CBC AUTOMATED: CPT | Performed by: OBSTETRICS & GYNECOLOGY

## 2023-01-27 RX ORDER — HYDROCODONE BITARTRATE AND ACETAMINOPHEN 5; 325 MG/1; MG/1
1-2 TABLET ORAL EVERY 6 HOURS PRN
Qty: 20 TABLET | Refills: 0 | Status: SHIPPED | OUTPATIENT
Start: 2023-01-27

## 2023-01-27 RX ORDER — IBUPROFEN 600 MG/1
600 TABLET ORAL EVERY 6 HOURS PRN
Qty: 30 TABLET | Refills: 1 | Status: SHIPPED | OUTPATIENT
Start: 2023-01-27

## 2023-01-27 RX ORDER — HYDROCODONE BITARTRATE AND ACETAMINOPHEN 5; 325 MG/1; MG/1
1 TABLET ORAL EVERY 6 HOURS PRN
Qty: 20 TABLET | Refills: 0 | Status: SHIPPED | OUTPATIENT
Start: 2023-01-27

## 2023-01-27 RX ORDER — IBUPROFEN 600 MG/1
600 TABLET ORAL EVERY 6 HOURS
Qty: 40 TABLET | Refills: 0 | Status: SHIPPED | OUTPATIENT
Start: 2023-01-27

## 2023-01-27 RX ORDER — ACETAMINOPHEN 325 MG/1
TABLET ORAL
Status: COMPLETED
Start: 2023-01-27 | End: 2023-01-27

## 2023-01-27 RX ORDER — ACETAMINOPHEN 325 MG/1
650 TABLET ORAL EVERY 6 HOURS PRN
Status: DISCONTINUED | OUTPATIENT
Start: 2023-01-27 | End: 2023-01-27

## 2023-01-27 NOTE — PLAN OF CARE
A&Ox4. VSS. 2L NC. . Denies chest pain and SOB. Telemetry: SB.    GI: Abdomen soft, nondistended. Denies passing gas. Denies nausea. : Almendarez catheter in place, draining clear, pink tinged urine. Pt was having minimal urine output, OB paged, bolus of fluid ordered. Pain controlled with PRN and scheduled pain medications. Resting in bed. Incisions: 2 incisions at lower abd with skin glue are CDI. Small amount of serosang drainage on peripad. Diet: Carb controlled. Not much of an appetite after surgery yet. IVF running per order. All appropriate safety measures in place. All questions and concerns addressed.      Problem: Patient/Family Goals  Goal: Patient/Family Long Term Goal  Description: Patient's Long Term Goal: Discharge    Interventions:  - Return of ADLs, pain control, tolerate regular diet   - See additional Care Plan goals for specific interventions  Outcome: Progressing  Goal: Patient/Family Short Term Goal  Description: Patient's Short Term Goal: Comfort care    Interventions:   - Cluster care, prn pain meds, meds per [facility], slowly advance diet  - See additional Care Plan goals for specific interventions  Outcome: Progressing

## 2023-01-27 NOTE — PROGRESS NOTES
O2 sats dropped after receiving Dilaudid; patient denies CP or SOB    Pain now controlled, tolerating general diet  Abd soft, NT  urine clear in Almendarez    Hgb 11.0    POD 1 - doing well    PLAN -  Almendarez/leg bag teaching  Home later today  F/U 1 week for catheter removal  D/C instructions reviewed

## 2023-01-27 NOTE — PROGRESS NOTES
Notified Dr. Dc Forget that the patient's blood sugar is 225, and her heart rate is in the upper 40's. Dr. Dc Forget stated to order the medium dose sliding scale for Novolog Insulin for the patient. She also stated to place the patient on telemetry monitoring.

## 2023-01-27 NOTE — CM/SW NOTE
MSW called pt's room to discuss order for [facility], no answer.   MS[facility] and will follow up    Update 3:30pm  [facility] all set up with [facility]  P:[phone]

## 2023-01-27 NOTE — PROGRESS NOTES
NURSING DISCHARGE NOTE    Discharged Home via Wheelchair. Accompanied by Support staff  Belongings Taken by patient/family. Vss. Pt a&ox4. Pt on ra with 02 sats wnl. Lungs diminished at bases, IS use encouraged and given to pt at discharge. Pt denies n/v. Tolerating diet well. Reports she is passing flatus. Ambulating in halls without difficulty. Almendarez draining clear, yellow urine in good amounts. Almendarez care reviewed with pt and daughter and both show understanding. Iv removed, site intact. Discharge instructions reviewed with pt and pt shows understanding. RX for norco and motrin filled by [employer] and given to patient. Pt discharged home in stable condition.

## 2023-01-27 NOTE — PROGRESS NOTES
Vss. Pt resting in bed this am. Pt ambulated in room with standby assistance without difficulty. Pt on ra with 02 sats wnl. Lungs cta. Pt denies difficulty breathing or sob. Denies chest pain. Denies n/v. Tolerating diet well. Reports she is passing flatus. Abdomen soft, bsx4 present. Pt c/o lower abdominal pain, using scheduled toradol with good relief. Almendarez draining yellow urine in good amounts. ivf infusing, site intact. Lap sites to suprapubic area intact. No redness, swelling, or drainage. Namita pad with some bloody drainage noted. Pad changed this am. poc updated with pt and daughter and both show understanding. Will monitor. 1331: 02 sats low while patient sleeping. 02 sats 87% on ra while patient asleep. When awake, 02 sats 93%. Pt encouraged to take deep breaths. IS given and pt reaching 500. Will encourage IS use. Dr. Sherif Mukherjee notified. Will monitor. 1606: PT ambulated in halls without difficulty. Pt reports some improvement in lower abdominal pain. Would like to try tylenol for pain instead of oxycodone. Dr. Kurt Rodriguez notified and new orders received. At rest, 02 sats 93 % on ra. Almendarez draining yellow urine in good amounts. Will monitor.

## 2023-01-27 NOTE — HOME CARE LIAISON
Received referral via [employer] for Home Health services. Spoke w/ patient and her daughter and provided with list of [facility] providers from [facility], choice is [facility]. Agency reserved in [facility] and contact information placed on AVS.Financial interest disclosure provided.  Notified Veda Osman

## 2023-02-03 ENCOUNTER — OFFICE VISIT (OUTPATIENT)
Dept: UROLOGY | Facility: CLINIC | Age: 68
End: 2023-02-03
Attending: OBSTETRICS & GYNECOLOGY
Payer: MEDICARE

## 2023-02-03 VITALS — HEIGHT: 66 IN | BODY MASS INDEX: 23.78 KG/M2 | TEMPERATURE: 97 F | WEIGHT: 148 LBS

## 2023-02-03 DIAGNOSIS — N99.89 POSTOPERATIVE RETENTION OF URINE: Primary | ICD-10-CM

## 2023-02-03 DIAGNOSIS — R33.8 POSTOPERATIVE RETENTION OF URINE: Primary | ICD-10-CM

## 2023-02-03 PROCEDURE — 99212 OFFICE O/P EST SF 10 MIN: CPT

## 2023-02-03 NOTE — PATIENT INSTRUCTIONS
You have passed your voiding trial at 9:00am.  Please make sure you are drinking some water today. You can take your ES Tylenol and Ibuprofen to help with any swelling from the catheter. It is important to try and empty your bladder every two hours during the day. Try and empty again at 11:00am.  If you are unable to empty, try and drink a glass of water and try again 30-60 minutes later. If you have been unable to empty your bladder by 1:00pm, which is 4 hours after leaving the office, you will most likely be uncomfortable and you will need to come back into the office. If it is after 4pm, go to an urgent care or emergency room to have a catheter placed again. Please call our office at [phone] if you have any questions or concerns.

## 2023-02-03 NOTE — PROCEDURES
Voiding Trial Instructions  You have passed your voiding trial at 9:00am.  Please make sure you are drinking some water today. You can take your ES Tylenol and Ibuprofen to help with any swelling from the catheter. It is important to try and empty your bladder every two hours during the day. Try and empty again at 11:00am.  If you are unable to empty, try and drink a glass of water and try again 30-60 minutes later. If you have been unable to empty your bladder by 1:00pm, which is 4 hours after leaving the office, you will most likely be uncomfortable and you will need to come back into the office. If it is after 4pm, go to an urgent care or emergency room to have a catheter placed again. Please call our office at [phone] if you have any questions or concerns.

## 2023-02-03 NOTE — PROGRESS NOTES
TC to patient who reports emptying bladder about every 2 hours, maybe a little less since she took her diuretic. Bladder empties easily, feels well, no pain. Advised to call office w/any questions or concerns.

## 2023-02-10 ENCOUNTER — TELEPHONE (OUTPATIENT)
Dept: UROLOGY | Facility: CLINIC | Age: 68
End: 2023-02-10

## 2023-02-10 NOTE — TELEPHONE ENCOUNTER
Patient LM on RN line states that she is 2 weeks post op and  had a tinge of red blood when she patted dry after voiding. Patient voiced concern. Spoke with patient, states she had some spotting yesterday evening after wiping from voiding. Patient stated that later there was a smaller amount of blood on her pad and went to bed. Patient notes that this morning there was no blood. I informed her that she is still in the post operative recovery period and it is normal to have some spotting after 2 weeks. Patient states that she was a little more active yesterday and was walking up and down stairs. Explained that discharge and bleeding may be increased with activity and is normal up to 6-8 weeks postoperatively. Patient verbalized understanding, all questions were answered.

## 2023-02-25 ENCOUNTER — HOSPITAL ENCOUNTER (OUTPATIENT)
Age: 68
Discharge: HOME | End: 2023-02-25
Payer: MEDICARE

## 2023-02-25 VITALS
HEIGHT: 66 IN | TEMPERATURE: 99 F | SYSTOLIC BLOOD PRESSURE: 141 MMHG | OXYGEN SATURATION: 95 % | HEART RATE: 60 BPM | BODY MASS INDEX: 23.78 KG/M2 | WEIGHT: 148 LBS | DIASTOLIC BLOOD PRESSURE: 66 MMHG | RESPIRATION RATE: 18 BRPM

## 2023-02-25 DIAGNOSIS — M43.6 WRY NECK: Primary | ICD-10-CM

## 2023-02-25 PROCEDURE — 99204 OFFICE O/P NEW MOD 45 MIN: CPT

## 2023-02-25 PROCEDURE — 99214 OFFICE O/P EST MOD 30 MIN: CPT

## 2023-02-25 PROCEDURE — 96372 THER/PROPH/DIAG INJ SC/IM: CPT

## 2023-02-25 RX ORDER — METHYLPREDNISOLONE SODIUM SUCCINATE 125 MG/2ML
125 INJECTION, POWDER, LYOPHILIZED, FOR SOLUTION INTRAMUSCULAR; INTRAVENOUS ONCE
Status: COMPLETED | OUTPATIENT
Start: 2023-02-25 | End: 2023-02-25

## 2023-02-25 RX ORDER — KETOROLAC TROMETHAMINE 30 MG/ML
30 INJECTION, SOLUTION INTRAMUSCULAR; INTRAVENOUS ONCE
Status: COMPLETED | OUTPATIENT
Start: 2023-02-25 | End: 2023-02-25

## 2023-02-25 RX ORDER — METHOCARBAMOL 750 MG/1
750 TABLET, FILM COATED ORAL NIGHTLY
Qty: 21 TABLET | Refills: 0 | Status: SHIPPED | OUTPATIENT
Start: 2023-02-25 | End: 2023-03-18

## 2023-02-25 RX ORDER — NAPROXEN 500 MG/1
500 TABLET ORAL 2 TIMES DAILY PRN
Qty: 20 TABLET | Refills: 0 | Status: SHIPPED | OUTPATIENT
Start: 2023-02-25 | End: 2023-03-04

## 2023-02-25 RX ORDER — METHYLPREDNISOLONE 4 MG/1
TABLET ORAL
Qty: 1 EACH | Refills: 0 | Status: SHIPPED | OUTPATIENT
Start: 2023-02-25

## 2023-02-25 NOTE — ED QUICK NOTES
Pt reports of dizziness and started to shake in her arms and legs. Pt not responding as much verbally. Dr Leticia Payton and JAI Haney called to bedside and assisted pt to stretcher in Trendelenburg position. Door remained open and call light placed at pt left hand.

## 2023-02-25 NOTE — DISCHARGE INSTRUCTIONS
Continue steroid in the morning with breakfast.  Naproxen in the afternoon with lunch or dinner. Ice the neck. Progressed to heat with improvement. Muscle relaxant at night. For any acute worsening such as severe headache, dizziness, chest pain, shortness of breath report to the ER.

## 2023-02-25 NOTE — ED INITIAL ASSESSMENT (HPI)
C/o neck pain for 1 day. Pt reports waking up and having neck pain. otc meds with no relief. Pain with movement and neck turning. Recent hysterectomy 1/26.

## 2023-03-10 ENCOUNTER — OFFICE VISIT (OUTPATIENT)
Dept: UROLOGY | Facility: CLINIC | Age: 68
End: 2023-03-10
Attending: OBSTETRICS & GYNECOLOGY
Payer: MEDICARE

## 2023-03-10 VITALS — BODY MASS INDEX: 23.78 KG/M2 | WEIGHT: 148 LBS | HEIGHT: 66 IN | TEMPERATURE: 98 F

## 2023-03-10 DIAGNOSIS — N95.2 VAGINAL ATROPHY: ICD-10-CM

## 2023-03-10 DIAGNOSIS — K59.00 CONSTIPATION, UNSPECIFIED CONSTIPATION TYPE: ICD-10-CM

## 2023-03-10 DIAGNOSIS — Z98.890 POSTOPERATIVE STATE: Primary | ICD-10-CM

## 2023-03-10 DIAGNOSIS — N39.41 URGE INCONTINENCE: ICD-10-CM

## 2023-03-10 LAB
CONTROL RUN WITHIN 24 HOURS?: YES
GLUCOSE URINE: 1000
LEUKOCYTE ESTERASE URINE: NEGATIVE
NITRITE URINE: NEGATIVE

## 2023-03-10 PROCEDURE — 81002 URINALYSIS NONAUTO W/O SCOPE: CPT | Performed by: OBSTETRICS & GYNECOLOGY

## 2023-03-10 PROCEDURE — 87086 URINE CULTURE/COLONY COUNT: CPT | Performed by: OBSTETRICS & GYNECOLOGY

## 2023-03-10 PROCEDURE — 51701 INSERT BLADDER CATHETER: CPT

## 2023-03-10 PROCEDURE — 99212 OFFICE O/P EST SF 10 MIN: CPT

## 2023-03-10 RX ORDER — ESTRADIOL 0.1 MG/G
CREAM VAGINAL
Qty: 42.5 G | Refills: 3 | Status: SHIPPED | OUTPATIENT
Start: 2023-03-10

## 2023-04-13 ENCOUNTER — TELEPHONE (OUTPATIENT)
Facility: LOCATION | Age: 68
End: 2023-04-13

## 2023-04-18 ENCOUNTER — LAB ENCOUNTER (OUTPATIENT)
Dept: LAB | Age: 68
End: 2023-04-18
Attending: INTERNAL MEDICINE
Payer: MEDICARE

## 2023-04-18 DIAGNOSIS — E11.9 DIABETES MELLITUS WITHOUT COMPLICATION (HCC): ICD-10-CM

## 2023-04-18 DIAGNOSIS — D64.9 ANEMIA, UNSPECIFIED TYPE: ICD-10-CM

## 2023-04-18 DIAGNOSIS — E03.9 PRIMARY HYPOTHYROIDISM: ICD-10-CM

## 2023-04-18 DIAGNOSIS — E78.2 MIXED HYPERLIPIDEMIA: ICD-10-CM

## 2023-04-18 LAB
ALBUMIN SERPL-MCNC: 4 G/DL (ref 3.4–5)
ALBUMIN/GLOB SERPL: 1.1 {RATIO} (ref 1–2)
ALP LIVER SERPL-CCNC: 52 U/L
ALT SERPL-CCNC: 23 U/L
ANION GAP SERPL CALC-SCNC: 1 MMOL/L (ref 0–18)
AST SERPL-CCNC: 16 U/L (ref 15–37)
BASOPHILS # BLD AUTO: 0.05 X10(3) UL (ref 0–0.2)
BASOPHILS NFR BLD AUTO: 0.8 %
BILIRUB SERPL-MCNC: 0.4 MG/DL (ref 0.1–2)
BUN BLD-MCNC: 15 MG/DL (ref 7–18)
CALCIUM BLD-MCNC: 9.9 MG/DL (ref 8.5–10.1)
CHLORIDE SERPL-SCNC: 108 MMOL/L (ref 98–112)
CHOLEST SERPL-MCNC: 166 MG/DL (ref ?–200)
CO2 SERPL-SCNC: 28 MMOL/L (ref 21–32)
CREAT BLD-MCNC: 0.54 MG/DL
CREAT UR-SCNC: 14.3 MG/DL
EOSINOPHIL # BLD AUTO: 0.18 X10(3) UL (ref 0–0.7)
EOSINOPHIL NFR BLD AUTO: 3 %
ERYTHROCYTE [DISTWIDTH] IN BLOOD BY AUTOMATED COUNT: 12.7 %
EST. AVERAGE GLUCOSE BLD GHB EST-MCNC: 151 MG/DL (ref 68–126)
FASTING PATIENT LIPID ANSWER: YES
FASTING STATUS PATIENT QL REPORTED: YES
GFR SERPLBLD BASED ON 1.73 SQ M-ARVRAT: 100 ML/MIN/1.73M2 (ref 60–?)
GLOBULIN PLAS-MCNC: 3.5 G/DL (ref 2.8–4.4)
GLUCOSE BLD-MCNC: 118 MG/DL (ref 70–99)
HBA1C MFR BLD: 6.9 % (ref ?–5.7)
HCT VFR BLD AUTO: 40.5 %
HDLC SERPL-MCNC: 52 MG/DL (ref 40–59)
HGB BLD-MCNC: 13.1 G/DL
IMM GRANULOCYTES # BLD AUTO: 0.02 X10(3) UL (ref 0–1)
IMM GRANULOCYTES NFR BLD: 0.3 %
LDLC SERPL CALC-MCNC: 100 MG/DL (ref ?–100)
LYMPHOCYTES # BLD AUTO: 2.26 X10(3) UL (ref 1–4)
LYMPHOCYTES NFR BLD AUTO: 38.1 %
MCH RBC QN AUTO: 29.4 PG (ref 26–34)
MCHC RBC AUTO-ENTMCNC: 32.3 G/DL (ref 31–37)
MCV RBC AUTO: 90.8 FL
MICROALBUMIN UR-MCNC: <0.5 MG/DL
MONOCYTES # BLD AUTO: 0.76 X10(3) UL (ref 0.1–1)
MONOCYTES NFR BLD AUTO: 12.8 %
NEUTROPHILS # BLD AUTO: 2.66 X10 (3) UL (ref 1.5–7.7)
NEUTROPHILS # BLD AUTO: 2.66 X10(3) UL (ref 1.5–7.7)
NEUTROPHILS NFR BLD AUTO: 45 %
NONHDLC SERPL-MCNC: 114 MG/DL (ref ?–130)
OSMOLALITY SERPL CALC.SUM OF ELEC: 286 MOSM/KG (ref 275–295)
PLATELET # BLD AUTO: 377 10(3)UL (ref 150–450)
POTASSIUM SERPL-SCNC: 4.2 MMOL/L (ref 3.5–5.1)
PROT SERPL-MCNC: 7.5 G/DL (ref 6.4–8.2)
RBC # BLD AUTO: 4.46 X10(6)UL
SODIUM SERPL-SCNC: 137 MMOL/L (ref 136–145)
T3FREE SERPL-MCNC: 2.34 PG/ML (ref 2.4–4.2)
T4 FREE SERPL-MCNC: 1.3 NG/DL (ref 0.8–1.7)
TRIGL SERPL-MCNC: 71 MG/DL (ref 30–149)
TSI SER-ACNC: 0.3 MIU/ML (ref 0.36–3.74)
VLDLC SERPL CALC-MCNC: 12 MG/DL (ref 0–30)
WBC # BLD AUTO: 5.9 X10(3) UL (ref 4–11)

## 2023-04-18 PROCEDURE — 84439 ASSAY OF FREE THYROXINE: CPT

## 2023-04-18 PROCEDURE — 82043 UR ALBUMIN QUANTITATIVE: CPT

## 2023-04-18 PROCEDURE — 83036 HEMOGLOBIN GLYCOSYLATED A1C: CPT

## 2023-04-18 PROCEDURE — 84443 ASSAY THYROID STIM HORMONE: CPT

## 2023-04-18 PROCEDURE — 82570 ASSAY OF URINE CREATININE: CPT

## 2023-04-18 PROCEDURE — 36415 COLL VENOUS BLD VENIPUNCTURE: CPT

## 2023-04-18 PROCEDURE — 80061 LIPID PANEL: CPT

## 2023-04-18 PROCEDURE — 84481 FREE ASSAY (FT-3): CPT

## 2023-04-18 PROCEDURE — 80053 COMPREHEN METABOLIC PANEL: CPT

## 2023-04-18 PROCEDURE — 85025 COMPLETE CBC W/AUTO DIFF WBC: CPT

## 2023-04-18 NOTE — PROGRESS NOTES
I will discuss results with the patient in person at the next visit- Appt scheduled - date verified by me.  Amy Armando,

## 2023-04-19 NOTE — PROGRESS NOTES
I will discuss results with the patient in person at the next visit- Appt scheduled - date verified by me.  Nicole Kitchen,

## 2023-05-24 ENCOUNTER — HOSPITAL ENCOUNTER (OUTPATIENT)
Dept: GENERAL RADIOLOGY | Age: 68
Discharge: HOME | End: 2023-05-24
Attending: INTERNAL MEDICINE
Payer: MEDICARE

## 2023-05-24 DIAGNOSIS — M54.2 NECK PAIN: ICD-10-CM

## 2023-05-24 PROCEDURE — 72050 X-RAY EXAM NECK SPINE 4/5VWS: CPT | Performed by: INTERNAL MEDICINE

## 2023-08-30 ENCOUNTER — OFFICE VISIT (OUTPATIENT)
Dept: UROLOGY | Facility: CLINIC | Age: 68
End: 2023-08-30
Attending: OBSTETRICS & GYNECOLOGY
Payer: MEDICARE

## 2023-08-30 VITALS — HEIGHT: 66 IN | BODY MASS INDEX: 23.46 KG/M2 | TEMPERATURE: 98 F | WEIGHT: 146 LBS

## 2023-08-30 DIAGNOSIS — N95.2 VAGINAL ATROPHY: ICD-10-CM

## 2023-08-30 DIAGNOSIS — Z98.890 POSTOPERATIVE STATE: Primary | ICD-10-CM

## 2023-08-30 DIAGNOSIS — R39.89 BLADDER PAIN: ICD-10-CM

## 2023-08-30 PROCEDURE — 87186 SC STD MICRODIL/AGAR DIL: CPT | Performed by: OBSTETRICS & GYNECOLOGY

## 2023-08-30 PROCEDURE — 81002 URINALYSIS NONAUTO W/O SCOPE: CPT | Performed by: OBSTETRICS & GYNECOLOGY

## 2023-08-30 PROCEDURE — 87086 URINE CULTURE/COLONY COUNT: CPT | Performed by: OBSTETRICS & GYNECOLOGY

## 2023-08-30 PROCEDURE — 87077 CULTURE AEROBIC IDENTIFY: CPT | Performed by: OBSTETRICS & GYNECOLOGY

## 2023-08-30 PROCEDURE — 99212 OFFICE O/P EST SF 10 MIN: CPT

## 2023-08-30 PROCEDURE — 51701 INSERT BLADDER CATHETER: CPT | Performed by: OBSTETRICS & GYNECOLOGY

## 2023-08-30 RX ORDER — PHENAZOPYRIDINE HYDROCHLORIDE 200 MG/1
200 TABLET, FILM COATED ORAL 3 TIMES DAILY PRN
Qty: 30 TABLET | Refills: 3 | Status: SHIPPED | OUTPATIENT
Start: 2023-08-30

## 2023-09-05 ENCOUNTER — TELEPHONE (OUTPATIENT)
Dept: UROLOGY | Facility: CLINIC | Age: 68
End: 2023-09-05

## 2023-09-05 RX ORDER — NITROFURANTOIN 25; 75 MG/1; MG/1
100 CAPSULE ORAL 2 TIMES DAILY
Qty: 14 CAPSULE | Refills: 0 | Status: SHIPPED | OUTPATIENT
Start: 2023-09-05

## 2023-09-05 NOTE — TELEPHONE ENCOUNTER
TC to pt after she LM on RN line asking for a call back. LM on pt VM asking for specifics on what she needs. Provided RN number to call back.

## 2023-09-05 NOTE — TELEPHONE ENCOUNTER
TC to pt w/ ucx results. Pt reports she is having dysuria and that is why she called yesterday. Was calling for the results. TORB-Dr Marcum-macrobid 100 mg PO bid x 7 days. Pt verbalized an understanding and agrees w/ plan. All questions answered.

## 2023-10-02 ENCOUNTER — LAB ENCOUNTER (OUTPATIENT)
Dept: LAB | Age: 68
End: 2023-10-02
Attending: INTERNAL MEDICINE
Payer: MEDICARE

## 2023-10-02 DIAGNOSIS — R05.9 COUGH, UNSPECIFIED TYPE: ICD-10-CM

## 2023-10-02 DIAGNOSIS — J02.9 SORE THROAT: ICD-10-CM

## 2023-10-02 PROCEDURE — 87637 SARSCOV2&INF A&B&RSV AMP PRB: CPT

## 2023-10-03 LAB
FLUAV + FLUBV RNA SPEC NAA+PROBE: NOT DETECTED
FLUAV + FLUBV RNA SPEC NAA+PROBE: NOT DETECTED
RSV RNA SPEC NAA+PROBE: NOT DETECTED
SARS-COV-2 RNA RESP QL NAA+PROBE: NOT DETECTED

## 2023-11-11 ENCOUNTER — LAB ENCOUNTER (OUTPATIENT)
Dept: LAB | Age: 68
End: 2023-11-11
Attending: INTERNAL MEDICINE
Payer: MEDICARE

## 2023-11-11 DIAGNOSIS — E78.2 MIXED HYPERLIPIDEMIA: ICD-10-CM

## 2023-11-11 DIAGNOSIS — E03.9 PRIMARY HYPOTHYROIDISM: ICD-10-CM

## 2023-11-11 DIAGNOSIS — D64.9 ANEMIA, UNSPECIFIED TYPE: ICD-10-CM

## 2023-11-11 DIAGNOSIS — R73.9 HYPERGLYCEMIA: ICD-10-CM

## 2023-11-11 DIAGNOSIS — E55.9 VITAMIN D DEFICIENCY: ICD-10-CM

## 2023-11-11 LAB
ALBUMIN SERPL-MCNC: 4 G/DL (ref 3.4–5)
ALBUMIN/GLOB SERPL: 1 {RATIO} (ref 1–2)
ALP LIVER SERPL-CCNC: 64 U/L
ALT SERPL-CCNC: 20 U/L
ANION GAP SERPL CALC-SCNC: 4 MMOL/L (ref 0–18)
AST SERPL-CCNC: 24 U/L (ref 15–37)
BASOPHILS # BLD AUTO: 0.05 X10(3) UL (ref 0–0.2)
BASOPHILS NFR BLD AUTO: 0.7 %
BILIRUB SERPL-MCNC: 0.5 MG/DL (ref 0.1–2)
BUN BLD-MCNC: 10 MG/DL (ref 9–23)
CALCIUM BLD-MCNC: 10 MG/DL (ref 8.5–10.1)
CHLORIDE SERPL-SCNC: 106 MMOL/L (ref 98–112)
CHOLEST SERPL-MCNC: 172 MG/DL (ref ?–200)
CO2 SERPL-SCNC: 29 MMOL/L (ref 21–32)
CREAT BLD-MCNC: 0.74 MG/DL
EGFRCR SERPLBLD CKD-EPI 2021: 88 ML/MIN/1.73M2 (ref 60–?)
EOSINOPHIL # BLD AUTO: 0.2 X10(3) UL (ref 0–0.7)
EOSINOPHIL NFR BLD AUTO: 2.9 %
ERYTHROCYTE [DISTWIDTH] IN BLOOD BY AUTOMATED COUNT: 12.7 %
EST. AVERAGE GLUCOSE BLD GHB EST-MCNC: 160 MG/DL (ref 68–126)
FASTING PATIENT LIPID ANSWER: YES
FASTING STATUS PATIENT QL REPORTED: YES
GLOBULIN PLAS-MCNC: 3.9 G/DL (ref 2.8–4.4)
GLUCOSE BLD-MCNC: 96 MG/DL (ref 70–99)
HBA1C MFR BLD: 7.2 % (ref ?–5.7)
HCT VFR BLD AUTO: 41.8 %
HDLC SERPL-MCNC: 52 MG/DL (ref 40–59)
HGB BLD-MCNC: 13.6 G/DL
IMM GRANULOCYTES # BLD AUTO: 0.03 X10(3) UL (ref 0–1)
IMM GRANULOCYTES NFR BLD: 0.4 %
LDLC SERPL CALC-MCNC: 104 MG/DL (ref ?–100)
LYMPHOCYTES # BLD AUTO: 2.44 X10(3) UL (ref 1–4)
LYMPHOCYTES NFR BLD AUTO: 34.8 %
MCH RBC QN AUTO: 29.6 PG (ref 26–34)
MCHC RBC AUTO-ENTMCNC: 32.5 G/DL (ref 31–37)
MCV RBC AUTO: 91.1 FL
MONOCYTES # BLD AUTO: 0.69 X10(3) UL (ref 0.1–1)
MONOCYTES NFR BLD AUTO: 9.8 %
NEUTROPHILS # BLD AUTO: 3.6 X10 (3) UL (ref 1.5–7.7)
NEUTROPHILS # BLD AUTO: 3.6 X10(3) UL (ref 1.5–7.7)
NEUTROPHILS NFR BLD AUTO: 51.4 %
NONHDLC SERPL-MCNC: 120 MG/DL (ref ?–130)
OSMOLALITY SERPL CALC.SUM OF ELEC: 287 MOSM/KG (ref 275–295)
PLATELET # BLD AUTO: 393 10(3)UL (ref 150–450)
POTASSIUM SERPL-SCNC: 4.4 MMOL/L (ref 3.5–5.1)
PROT SERPL-MCNC: 7.9 G/DL (ref 6.4–8.2)
RBC # BLD AUTO: 4.59 X10(6)UL
SODIUM SERPL-SCNC: 139 MMOL/L (ref 136–145)
T4 FREE SERPL-MCNC: 1.4 NG/DL (ref 0.8–1.7)
TRIGL SERPL-MCNC: 88 MG/DL (ref 30–149)
TSI SER-ACNC: 0.42 MIU/ML (ref 0.36–3.74)
VIT D+METAB SERPL-MCNC: 35.3 NG/ML (ref 30–100)
VLDLC SERPL CALC-MCNC: 15 MG/DL (ref 0–30)
WBC # BLD AUTO: 7 X10(3) UL (ref 4–11)

## 2023-11-11 PROCEDURE — 82306 VITAMIN D 25 HYDROXY: CPT

## 2023-11-11 PROCEDURE — 85025 COMPLETE CBC W/AUTO DIFF WBC: CPT

## 2023-11-11 PROCEDURE — 80053 COMPREHEN METABOLIC PANEL: CPT

## 2023-11-11 PROCEDURE — 80061 LIPID PANEL: CPT

## 2023-11-11 PROCEDURE — 84439 ASSAY OF FREE THYROXINE: CPT

## 2023-11-11 PROCEDURE — 83036 HEMOGLOBIN GLYCOSYLATED A1C: CPT

## 2023-11-11 PROCEDURE — 36415 COLL VENOUS BLD VENIPUNCTURE: CPT

## 2023-11-11 PROCEDURE — 84443 ASSAY THYROID STIM HORMONE: CPT

## 2023-11-13 NOTE — PROGRESS NOTES
I will discuss results with the patient in person at the next visit- Appt scheduled - date verified by me.  Anival Ivy,

## 2023-12-08 ENCOUNTER — HOSPITAL ENCOUNTER (OUTPATIENT)
Dept: MAMMOGRAPHY | Age: 68
Discharge: HOME | End: 2023-12-08
Attending: INTERNAL MEDICINE
Payer: MEDICARE

## 2023-12-08 DIAGNOSIS — Z12.31 VISIT FOR SCREENING MAMMOGRAM: ICD-10-CM

## 2023-12-08 PROCEDURE — 77063 BREAST TOMOSYNTHESIS BI: CPT | Performed by: INTERNAL MEDICINE

## 2023-12-08 PROCEDURE — 77067 SCR MAMMO BI INCL CAD: CPT | Performed by: INTERNAL MEDICINE

## 2023-12-19 ENCOUNTER — HOSPITAL ENCOUNTER (OUTPATIENT)
Dept: MAMMOGRAPHY | Facility: HOSPITAL | Age: 68
Discharge: HOME | End: 2023-12-19
Attending: INTERNAL MEDICINE
Payer: MEDICARE

## 2023-12-19 DIAGNOSIS — R92.2 INCONCLUSIVE MAMMOGRAM: ICD-10-CM

## 2023-12-19 PROCEDURE — 77065 DX MAMMO INCL CAD UNI: CPT | Performed by: INTERNAL MEDICINE

## 2023-12-19 PROCEDURE — 77061 BREAST TOMOSYNTHESIS UNI: CPT | Performed by: INTERNAL MEDICINE

## 2024-02-13 ENCOUNTER — TELEPHONE (OUTPATIENT)
Dept: UROLOGY | Facility: CLINIC | Age: 69
End: 2024-02-13

## 2024-02-13 NOTE — TELEPHONE ENCOUNTER
Contacted patient regarding 1 Year post-op follow-up for 2/28/24 regarding possible appointment time change due to clinic schedule.  LVM with call back number as well as possible time slot change from 1PM to 10:30AM.

## 2024-02-13 NOTE — TELEPHONE ENCOUNTER
TC from patient returning our VM regarding time change for 2/28/24 appointment.  Patient accepted time change from 1pm to 10:30am due to clinic schedule.  Patient confirmed 10:30am appointment on 2/28/24.

## 2024-02-19 ENCOUNTER — TELEPHONE (OUTPATIENT)
Dept: UROLOGY | Facility: CLINIC | Age: 69
End: 2024-02-19

## 2024-02-19 NOTE — TELEPHONE ENCOUNTER
Spoke to patient to remind of 1 year post-op appointment on 2/28/24.  Patient confirmed date, time, and clinic location

## 2024-02-28 ENCOUNTER — OFFICE VISIT (OUTPATIENT)
Dept: UROLOGY | Facility: CLINIC | Age: 69
End: 2024-02-28
Attending: OBSTETRICS & GYNECOLOGY
Payer: MEDICARE

## 2024-02-28 VITALS
DIASTOLIC BLOOD PRESSURE: 75 MMHG | WEIGHT: 148 LBS | SYSTOLIC BLOOD PRESSURE: 138 MMHG | BODY MASS INDEX: 23.78 KG/M2 | HEIGHT: 66 IN

## 2024-02-28 DIAGNOSIS — N95.2 VAGINAL ATROPHY: ICD-10-CM

## 2024-02-28 PROCEDURE — 99212 OFFICE O/P EST SF 10 MIN: CPT

## 2024-02-28 RX ORDER — ESTRADIOL 0.1 MG/G
CREAM VAGINAL
Qty: 42.5 G | Refills: 3 | Status: SHIPPED | OUTPATIENT
Start: 2024-02-28

## 2024-02-28 NOTE — PROGRESS NOTES
Sherita Gutierres  3/8/1955  2/28/24    Chief Complaint   Patient presents with    Post-Op     1 yr       HPI:  The patient is a 68 year-old female who is status post TVH, BSO, USLS, APE, sling, cysto on 1/26/23. Surgery was indicated secondary to stage 2 POP, ROBERT. Uncomplicated. She was last seen in the office on 8/30/23 for interval post op visit. Has UGA, recommended estrace and pyridium prn bladder pain.  Had + UTI at last visit, treated with NTF. Returns for one year postop visit.     Interval history:  Doing well.  No bulge. Has some UUI. No ROBERT. Bowels are better.  She has been using Estrace vaginal cream. Denies dysuria or pain. No more UTI symptoms.  Not sexually active at this time.     Objective:  /75   Ht 66\"   Wt 148 lb (67.1 kg)   BMI 23.89 kg/m²     Gen: Alert and oriented, no acute distress  Respiratory: Normal respiratory effort  Abdomen: non-tender, non distended. No rebound or guarding. Sling sites intact.   Pelvic:  Cough stress test: Negative in the supine position.  External genitalia: Normal for age.   Vagina: + Atrophy.  No lesions.  No mesh exposure.   Support: Mild anterior vaginal wall relaxation.   Tenderness: None  Pelvic floor muscle strength: 2/5    Impression:  Encounter Diagnosis   Name Primary?    Vaginal atrophy          Plan:  The patient is doing well. Continue Estrace x 2-3 per week.   Follow up in 1 year or sooner prn.     Eileen Marcum MD, FACOG, FACS  Female Pelvic Medicine and  Reconstructive Surgery (Urogynecology)  [phone] (Pager)

## 2024-09-11 ENCOUNTER — HOSPITAL ENCOUNTER (OUTPATIENT)
Dept: BONE DENSITY | Age: 69
Discharge: HOME | End: 2024-09-11
Attending: INTERNAL MEDICINE
Payer: MEDICARE

## 2024-09-11 DIAGNOSIS — Z13.820 OSTEOPOROSIS SCREENING: ICD-10-CM

## 2024-09-11 DIAGNOSIS — Z78.0 MENOPAUSE: ICD-10-CM

## 2024-09-11 PROCEDURE — 77080 DXA BONE DENSITY AXIAL: CPT | Performed by: INTERNAL MEDICINE

## 2024-12-18 ENCOUNTER — OFFICE VISIT (OUTPATIENT)
Dept: UROLOGY | Facility: CLINIC | Age: 69
End: 2024-12-18
Attending: OBSTETRICS & GYNECOLOGY
Payer: MEDICARE

## 2024-12-18 VITALS
BODY MASS INDEX: 24 KG/M2 | WEIGHT: 150 LBS | SYSTOLIC BLOOD PRESSURE: 140 MMHG | DIASTOLIC BLOOD PRESSURE: 68 MMHG | TEMPERATURE: 98 F

## 2024-12-18 DIAGNOSIS — N81.89 PELVIC FLOOR WEAKNESS IN FEMALE: ICD-10-CM

## 2024-12-18 DIAGNOSIS — N95.2 VAGINAL ATROPHY: ICD-10-CM

## 2024-12-18 DIAGNOSIS — R30.0 DYSURIA: Primary | ICD-10-CM

## 2024-12-18 DIAGNOSIS — R33.9 INCOMPLETE BLADDER EMPTYING: ICD-10-CM

## 2024-12-18 LAB
CONTROL RUN WITHIN 24 HOURS?: YES
LEUKOCYTE ESTERASE URINE: NEGATIVE
NITRITE URINE: NEGATIVE

## 2024-12-18 PROCEDURE — 87086 URINE CULTURE/COLONY COUNT: CPT | Performed by: OBSTETRICS & GYNECOLOGY

## 2024-12-18 PROCEDURE — 99212 OFFICE O/P EST SF 10 MIN: CPT

## 2024-12-18 PROCEDURE — 81002 URINALYSIS NONAUTO W/O SCOPE: CPT | Performed by: OBSTETRICS & GYNECOLOGY

## 2024-12-18 PROCEDURE — 51701 INSERT BLADDER CATHETER: CPT

## 2024-12-18 RX ORDER — NITROFURANTOIN 25; 75 MG/1; MG/1
100 CAPSULE ORAL 2 TIMES DAILY
Qty: 14 CAPSULE | Refills: 0 | Status: SHIPPED | OUTPATIENT
Start: 2024-12-18 | End: 2024-12-25

## 2024-12-18 RX ORDER — PHENAZOPYRIDINE HYDROCHLORIDE 200 MG/1
200 TABLET, FILM COATED ORAL 3 TIMES DAILY PRN
Qty: 30 TABLET | Refills: 3 | Status: SHIPPED | OUTPATIENT
Start: 2024-12-18

## 2024-12-18 NOTE — PROGRESS NOTES
Sherita Gutierres  3/8/1955  2/28/24    Chief Complaint   Patient presents with    Bladder Pain     12/12/24 mixed urogenital luisa  11/21/24 mixed urogenital luisa  11/04/24 >100k E Coli Bactrim x10d    Post-Op     Surgery 01/26/23       HPI:  The patient is a 68 year-old female who is status post TVH, BSO, USLS, APE, sling, cysto on 1/26/23. Surgery was indicated secondary to stage 2 POP, ROBERT. Uncomplicated. She was last seen in the office on 8/30/23 for interval post op visit. Has UGA, recommended estrace and pyridium prn bladder pain.  Had + UTI at last visit, treated with NTF. Returns for one year postop visit.     Interval history:  Had UTI symptoms on 11/4/24, treated by PCP with Bactrim for 10 days.   Has had persistent symptoms since then with dysuria. Had repeat culture on 12/12/24 that showed contamination.  Using Estrace x 2-3 per week.   Voiding every 1-2 hours during the day and x 2 at night.  Rare UUI  No ROBERT  Bowels are regular.   Not sexually active at this time.     Objective:  /68   Temp 98.4 °F (36.9 °C)   Wt 150 lb (68 kg)   BMI 24.21 kg/m²     Gen: Alert and oriented, no acute distress  Respiratory: Normal respiratory effort  Abdomen: non-tender, non distended. No rebound or guarding. Sling sites intact.   Pelvic:  Cough stress test: Negative in the supine position.  External genitalia: Normal for age.   Vagina: + Atrophy.  No lesions.  No mesh exposure.   Support: Mild anterior vaginal wall relaxation. Unchanged from previous visit.   Tenderness: None  Pelvic floor muscle strength: 2/5    The patient voided 450 ml in the privacy of the bathroom. She was catheterized for PVR of 120 ml. Urine dip trace blood. Specimen sent for C&S.     Impression:  Encounter Diagnoses   Name Primary?    Dysuria Yes    Vaginal atrophy     Pelvic floor weakness in female     Incomplete bladder emptying        Plan:  Continue Estrace x 2-3 per week.   Rx for  mg PO bid x 7 days to use if worsening  symptoms occur while traveling (going to [place] for 3 weeks)  Pyridium 200 mg PO tid prn dysuria  Referral to pelvic floor PT  Office cystoscopy.   Patient agrees with plan.    Eileen Marcum MD, FACOG, FACS  Female Pelvic Medicine and  Reconstructive Surgery (Urogynecology)  [phone] (Pager)

## 2025-01-22 ENCOUNTER — OFFICE VISIT (OUTPATIENT)
Dept: PHYSICAL THERAPY | Age: 70
End: 2025-01-22
Attending: OBSTETRICS & GYNECOLOGY
Payer: MEDICARE

## 2025-01-22 ENCOUNTER — TELEPHONE (OUTPATIENT)
Dept: PHYSICAL THERAPY | Facility: HOSPITAL | Age: 70
End: 2025-01-22

## 2025-01-22 DIAGNOSIS — N81.89 PELVIC FLOOR WEAKNESS IN FEMALE: Primary | ICD-10-CM

## 2025-01-22 PROCEDURE — 97112 NEUROMUSCULAR REEDUCATION: CPT | Performed by: PHYSICAL THERAPIST

## 2025-01-22 PROCEDURE — 97161 PT EVAL LOW COMPLEX 20 MIN: CPT | Performed by: PHYSICAL THERAPIST

## 2025-01-22 NOTE — PATIENT INSTRUCTIONS
To promote more complete bladder emptying:    Sit with feet wide apart heels on ground, elbows propped on thighs, lean forward & breathe from diaphragm to tip bladder anteriorly.  Pull upwards (do not push) on the lower pelvic/ pubic region to lift bladder  Can “rock & roll” & complete pelvic circles to aid with bladder emptying  When finished voiding, stand up; if you are feeling you need to void again you may sit and repeat if needed (double void)                  DIAPHRAGMATIC BREATHING     The diaphragm is a dome shaped muscle that forms the floor of the rib cage. It is the most efficient muscle for breathing and using this muscle aides in relaxation. Diaphragmatic breathing is an important technique to learn because it helps settle down or relax the autonomic nervous system. The correct use of diaphragmatic breathing can help to quiet brain activity resulting in the relaxation of all the muscles and organs of the body. This is accomplished by slow rhythmic breathing concentrated in the diaphragm muscle rather than the upper chest.      HOW TO DO PROPER RELAXATION BREATHING  Start by lying on your back or reclining in a chair in a relaxed position. Place your hands around the lower portion of your rib cage.  Relax your jaw by placing your tongue on the roof of your mouth and keeping your teeth slightly apart.   Take a deep breath in for 4 count through your nose, letting your rib cage widen and your abdomen expand for 7 second hold. Keep your upper chest, neck and shoulders relaxed as you breathe in.  As you breathe out for 8 count through your mouth, allow your abdomen and chest to fall. Exhale completely then bulge perineum.  Remember to breathe slowly.  Do not force your breathing.  Practice this for 3 minutes, 1-2x/ day as needed.

## 2025-01-22 NOTE — PROGRESS NOTES
MUSCULOSKELETAL AND PELVIC FLOOR EVALUATION:     Diagnosis:   Pelvic floor weakness in female (N81.89) Patient:  Sherita Gutierres (69 year old, female)        Referring Provider: Eileen Marcum  Today's Date   2025    Precautions:  Drug Allergy (DM, HTN)   Date of Evaluation: 25  Next MD visit: 2025  Date of Surgery: No data recorded     PATIENT SUMMARY   Summary of chief complaints: pain with urination that is not severe or unbearable but is bothersome, described as a burning sensation, & is variable in intensity  History of current condition: dysuria for several months duration; pt reports hx 1 UTI last year & 1 this year with the pain starting while pt had UTIs but has lingered despite most recent infection being gone. No current antibiotic use. Uses coconut oil for soothing relief externally   Pain level: current 5 /10, at best 4 /10, at worst 7 /10  Occupation: N/A   Prior level of function: no pain with voiding prior to recent UTI. No hx PFPT  Current limitations: dysuria, longer time to complete void, possible incomplete bladder emptying, straining with defecation, nocturia 2x, increased urinary frequency (variable on water intake), UUI associated with walking to bathroom, wear of pads  Pt goals: to be able to hold the urine more to not leak as much, get rid of the pain  OB History    Para Term  AB Living   4 2 0 0 2 0   SAB IAB Ectopic Multiple Live Births   0 0 0 0 0      # Outcome Date GA Lbr Walter/2nd Weight Sex Type Anes PTL Lv   4 AB            3 AB            2 Para      NORMAL SPONT      1 Para      NORMAL SPONT         Obstetric Comments   No abnl      PFDI 20    Scores   POPDI 6:    33.33   CRAD 8:    34.38   WILLIAM 6:    62.5   Summary:    130.21      Outpatient Therapy Pelvic Health Intake       Question 2025 11:57 AM CST - Filed by Patient    Do you usually experience pressure in the lower abdomen? Somewhat    Do you usually experience heaviness or dullness in  the pelvic area? Not at all    Do you usually have a bulge or something falling out that you can see or feel in your vaginal area? Not at all    Do you ever have to push on the vagina or around the rectum to have or complete a bowel movement? Not at all    Do you usually experience a feeling of incomplete bladder emptying? Somewhat    Do you ever have to push up on a bulge in the vaginal area with your fingers to start or complete urination? Not at all    Please provide details on the following urinary history / complaints     Frequency of urination: # of times/day 10    Frequency of urination: # of times/night 2    When you have the urge to urinate, how long can you delay before you have to go to the toilet? (# of minutes or hours) 1    Do you have a history of urinary tract infections: Yes    Do you have a history of urine loss (select all that apply)       How many times a day does leakage occur? 4    If you have leakage, what type(s) of protective device(s) do you use? (Select all that apply) Panty liner     Sanitary pad (mini)    On average, how many pads do you use each day? 2    Do you soak the pad fully? No    Do you change the pad each time it is wet? No    Do you experience any of these symptoms? (Select all that apply) Have burning with urination     Dribble after you empty your bladder    Do you usually experience frequent urination? Somewhat    Do you usually experience urine leakage associated with a feeling of urgency, that is, a strong sensation of needing to go to the bathroom? Quite a bit    Do you usually experience urine leakage related to coughing, sneezing, or laughing? Somewhat    Do you usually experience small amounts of urine leakage (that is, drops)? Somewhat    Do you usually experience difficulty empyting your bladder? Moderately    Do you usually experience pain or discomfort in the lower abdomen or genital region? Somewhat    Have you ever had any of the following treatment:     Have  you ever done exercises to control urine loss? If so, for how long? no    Has your doctor ever prescribed any medication for urine loss? No    Have you had any surgical procedures to treat urine loss? No    Please provide details on the following bowel history / complaints.     How many bowel movements do you have per day? 1    How many bowel movements do you have per night? none    Do you experience diarrhea? If yes, how often? rare    Do you feel you need to strain too hard to have a bowel movement? Somewhat    Do you feel you have not completely emptied your bowels at the end of a bowel movement? Somewhat    Do you usually lose stool beyond your control if your stool is well formed? Not at all    Do you usually lose stool beyond your control if your stool is loose? Not present    Do you usually lose gas from the rectum beyond your control? Somewhat    Do you usually have pain when you pass your stool? Not at all    Do you experience strong sense of urgency and have to rush to bathroom for bowel movement? Somewhat    Does part of bowel ever pass through rectum and bulge outside during/after bowel movement? Not at all    Please provide details on your daily fluid/food intake.     On average, what is your daily fluid intake (1 glass=8ounces)? (# of glasses per day) 10    How many are caffeinated? (# of glasses per day) 2    Do you restrict fluids because of incontenence (leakage)? No    Do you include fiber in your diet (fruits, vegetables, bran, etc.)? Yes    Psychosocial information     Do you live alone? No    Which recreational activities do you participate in if any? prayer and watching tv or youtube videos    Have you had to restrict your activities due to your pelvic health concerns? No    On a scale of 0 (no impairments) to 10 (severe impairments), what are your feelings about your urinary or bowel incontinence or pelvic pain? 6    Do you have pain with intercourse? No    Have you had any changes in intimate  relationships/sexual function due to your symptoms?  No    Your personal safety is of utmost importance to us at [facility], please answer the following questions:     Are you being hurt, frightened, demeaned, or taken advantage of by anyone at your home or in your life?  No    Have you recently had thoughts of hurting yourself? No    Have you tried to hurt yourself in the past?  No    Pelvic Organ Prolapse Distress Inventory 6 Score (range: 0 - 100) 33.33    Colorectal-Anal Distress Inventory 8 Score (range: 0 - 100) 34.38    Urinary Distress Inventory 6 Score (range: 0 - 100) 62.5    PFDI Summary Score (range: 0 - 300) 130.21            Past medical history was reviewed with Sherita.  Significant findings include: status post TVH, BSO, USLS, APE, sling, cysto on 1/26/23. Surgery was indicated secondary to stage 2 POP, ROBERT  Sherita  has a past medical history of Diabetes (HCC) (09/27/2009), Elevated LFT's (06/18/2008), ENDOMETRIOSIS  (06/18/2008), Essential hypertension, HYPERTENSION (06/18/2008), HYPOTHYROIDISM (06/18/2008), IBS (irritable bowel syndrome) (08/16/2011), Type II or unspecified type diabetes mellitus without mention of complication, not stated as uncontrolled, Visual impairment, and Vitamin D deficiency (05/10/2010).  She  has a past surgical history that includes ligate fallopian tube; other surgical history; hysterectomy (01/26/2023); uterosacral ligament suspension (01/26/2023); anterior repair (01/26/2023); enterocele repair (01/26/2023); posterior repair (01/26/2023); midurethral sling (01/26/2023); cystourethroscopy (01/26/2023); and laparoscopy,diagnostic.    ASSESSMENT  Sherita presents to physical therapy evaluation with primary c/o pain with urination that is not severe or unbearable but is bothersome, described as a burning sensation, & is variable in intensity. The results of the objective tests and measures show external impairments in posture, breathing & abdominal bracing  mechanics, soft tissue mobility, lumbar ROM, hip abduction strength, HS & piriformis flexibility with additional external & internal measures to be determined at subsequent visit, limited today secondary to time available today. Functional deficits include but are not limited to dysuria, longer time to complete void, possible incomplete bladder emptying, straining with defecation, nocturia 2x, increased urinary frequency (variable on water intake), UUI associated with walking to bathroom, wear of pads. Signs and symptoms are consistent with diagnosis of Pelvic floor weakness in female (N81.89). Pt and PT discussed evaluation findings, pathology, POC and HEP.  Pt voiced understanding and performs HEP correctly without reported pain. Skilled Physical Therapy is medically necessary to address the above impairments and reach functional goals.    OBJECTIVE:   Musculoskeletal  Posture: Posture: reduced lumbar lordotic curve   External Palpation: increased tension/ hypertrophy to B T/L PSM   Abdominal Wall Integrity: soft, mild bogginess, pain-free    Special Tests: decreased posterolateral expansion with inhalation; doming with abdominal bracing    Informed consent for internal pelvic evaluation given:Yes (deferred to subsequent visit)     INOCENCIA ROM WNL and Strength:  (* denotes performed with pain)  Trunk ROM     Flex 50%     Ext 50%    R L     Side bend 50% 50%     Rotation 50% 50%   ,   Hip   MMT (-/5)    R L     Abd 3+ 3+       Flexibility:  LE Flexibility R L     Hamstrings 50% 50%     Piriformis 50% 50%       Today's Treatment and Response:   Pt education was provided on exam findings, treatment diagnosis, treatment plan, expectations, and prognosis.  Today's Treatment       1/22/2025   Treatment   Neuro Re-Ed Bladder emptying techniques  Diaphragmatic Breathing   Neuro Re-Ed Min 8   Evaluation Min 25   Total of Timed Procedures 8   Total of Service Based 25   Total Treatment Time 33          No data to display                  Patient was instructed in and issued a HEP for: Bladder emptying techniques  Diaphragmatic Breathing    Charges:  PT EVAL: Low Complexity, Neuro X 1  In agreement with evaluation findings and clinical rationale, this evaluation involved LOW COMPLEXITY decision making due to no personal factors/comorbidities, 1-2 body structures involved/activity limitations, and stable symptoms as documented in the evaluation.                                                                       PLAN OF CARE:    Goals: (to be met in 8 visits)   Goals       Therapy Goals      Not Met Progress Toward Partially Met Met    [] [] [] []   Patient will demonstrate PFM lengthening WNL with coordinated diaphragmatic breathing x 10 reps to alleviate pain with voiding & promote more WNL void durations as well as more complete bladder emptying. [] [] [] []   Patient will report decreased urinary frequency to 6-8x/day and 0-1x/night indicating normalizing micturition reflex for improved bladder health and function. [] [] [] []   Patient will report ability to postpone urination for at least 15 mins after initial urge presents for improved ability to participate in community outings without fear of UI. [] [] [] []   Patient will report adherence to HEP for continued exercise benefits following cessation of PT. [] [] [] []                    Frequency / Duration: Patient will be seen 1-2x/week or a total of 8  visits over a 90 day period. Treatment will include: Manual Therapy; Home Exercise Program instruction; Neuromuscular Re-education; Self-Care Home Management; Therapeutic Activities    Education or treatment limitation: None   Rehab Potential: excellent     Patient/Family/Caregiver was advised of these findings, precautions, and treatment options and has agreed to actively participate in planning and for this course of care.    Thank you for your referral. Please co-sign or sign and return this letter via fax as soon as possible to  [phone]. If you have any questions, please contact me at Dept: [phone]    Sincerely,  Electronically signed by therapist: Naheed Milligan PT  Physician's certification required: Yes  I certify the need for these services furnished under this plan of treatment and while under my care.    X___________________________________________________ Date____________________    Certification From: 1/22/2025  To:4/22/2025

## 2025-01-27 ENCOUNTER — TELEPHONE (OUTPATIENT)
Dept: PHYSICAL THERAPY | Facility: HOSPITAL | Age: 70
End: 2025-01-27

## 2025-01-29 ENCOUNTER — APPOINTMENT (OUTPATIENT)
Dept: PHYSICAL THERAPY | Age: 70
End: 2025-01-29
Attending: OBSTETRICS & GYNECOLOGY
Payer: MEDICARE

## 2025-02-04 ENCOUNTER — OFFICE VISIT (OUTPATIENT)
Dept: PHYSICAL THERAPY | Age: 70
End: 2025-02-04
Attending: OBSTETRICS & GYNECOLOGY
Payer: MEDICARE

## 2025-02-04 PROCEDURE — 97112 NEUROMUSCULAR REEDUCATION: CPT | Performed by: PHYSICAL THERAPIST

## 2025-02-04 PROCEDURE — 97535 SELF CARE MNGMENT TRAINING: CPT | Performed by: PHYSICAL THERAPIST

## 2025-02-04 PROCEDURE — 97140 MANUAL THERAPY 1/> REGIONS: CPT | Performed by: PHYSICAL THERAPIST

## 2025-02-04 NOTE — PATIENT INSTRUCTIONS
COORDINATING THE PELVIC FLOOR AND BREATHING DIAPHRAGM      Learning to coordinate and contract the pelvic floor with exhalation is a practical technique for bladder control. It is useful to contract the pelvic floor during exhalation which occurs during coughing, sneezing and laughing.  For continence the pelvic floor muscles should be trained to contract when you are exhaling.      FUNCTIONAL BREATHING EXERCISE  Focus on the relationship between your breathing diaphragm and the pelvic floor/ lower abdomen muscles.  As you breathe in, let the pelvic floor relax.  As you exhale, tighten and contract the pelvic floor as if the muscles are helping the air leave your lungs.              Start by lying on your back or reclining in a chair in a relaxed position. Place one hand on your chest and the other on your belly.   Relax your jaw by placing your tongue on the roof of your mouth so that your teeth are not touching.   Take a breath in through your nose, letting the abdomen and ribs expand and rise while you keep your upper chest relaxed.  Contract the pelvic floor (closing of rectum/ vagina) and lower abdomen muscles for an audible 5 count as you exhale through your mouth.  Practice coordinating the muscles for 3 minutes, 1-2x/ day as tolerated  Place 1-2 pillows under pelvis for elevation and/ or Prop legs on 55 cm Exercise ball          BLADDER HEALTH      WHAT IS CONSIDERED NORMAL?  The average bladder can hold about 2 cups of urine before it needs to be emptied.  The normal range of voiding urine is 6 to 8 times during a 24 hour period. As we get older, our bladder capacity can get smaller and we may need to pass urine more frequently but usually not more than every 2 hours.  Urine should flow easily without discomfort in a good, steady stream until the bladder is empty. No pushing or straining is necessary to empty the bladder.  An urge is a signal that you feel as the bladder stretches to fill with urine. Urges  can be felt even if the bladder is not full. Urges are not commands to go to the toilet, merely a signal and can be controlled.    WHAT ARE GOOD BLADDER HABITS?  Take your time when emptying your bladder. Don't strain or push to empty your bladder. Make sure you empty your bladder completely each time you pass urine. Do not rush the process.   Consistently ignoring the urge to go (waiting more than 4 hours between toileting) or urinating too infrequently may be convenient but not healthy for your bladder.  Avoid going to the toilet “just in case” or more often than every 2 hours. It is usually not necessary to go when you feel the first urge. Try to go only when your bladder is full. Urgency and frequency of urination can be improved by retraining the bladder and spacing your fluid intake throughout the day. Practice good toilet habits. Don't let your bladder control your life.    TIPS TO MAINTAIN GOOD BLADDER HABITS  Maintain a good fluid intake. Depending on your body size and environment, drink 6 -8 cups (8 oz each) of fluid per day unless otherwise advised by your doctor. Not enough fluid creates a foul odor and dark color of the urine.  Limit the amount of caffeine (coffee, cola, chocolate or tea) and citrus foods that you consume as these foods can be associated with increased sensation of urinary urgency and frequency. See the handout “How Diet Can Affect Your Bladder” for more information.  Limit the amount of alcohol you drink. Alcohol increases urine production and also makes it difficult for the brain to coordinate bladder control.  Avoid constipation by maintaining a balanced diet of dietary fiber.    Cigarette smoking is also irritating to the bladder surface and is associated with bladder cancer.  In addition, the coughing associated with smoking may lead to increased incontinent episodes.            SKIN CARE, ABSORBENT PRODUCTS,   AND ODOR CONTROL    The presence of urine on the skin can cause  significant skin irritation resulting in redness, itching and discomfort.  If left untreated, infection and skin breakdown can occur.  It is possible to improve the situation by following a few simple steps.  Always talk to your doctor or health care provider about your condition and get his/her suggestions.  Persistent problems may require medication to cure.    The following are tips to help keep your skin healthy:  Drink plenty of water.  Concentrated urine is more irritating to both the skin and the bladder.  Use pads/protection specifically designed to absorb urine.  Change them often for optimum skin health. Wear cotton underwear and do not use menstrual pads whenever possible.  Do not wear protection at nighttime or during times when you do not experience leakage.  Wash the area with clear water and pat dry or use a blow dryer on the cool setting.  Drying the skin is important because irritation is increased by constant moisture.  Skin protection is essential if urine leakage is continuous or if skin irritation is present.  Protection is usually in the form of a moisture barrier ointment that is applied to clean dry skin.  Common barrier protection ointments such as A and D Ointment®, Bag Balm® or Vaseline® are available over the counter.  These should be used with your physicians' approval.  Ask about proper application techniques.  Women should avoid wearing nylon underpants and pantyhose directly against their skin, cotton underpants are preferable.  Women should also avoid colored or perfumed toilet tissue, sanitary napkins and laundry detergents because the dyes can be irritating to the area.     ODOR CONTROL  Some foods can cause urine to smell bad.  While the most notable of these foods is asparagus, other foods may also affect the way your urine smells.  If however, you notice that your urine has a strong odor and you can rule out your diet as the cause you should see a physician to have a urine test  done.  Your physician will check for a bladder infection that is another common cause of foul smelling urine.  Urinary tract infections must be dealt with immediately.    In addition to foods, some medications may cause the urine to discolor or have an unusual odor.  Some of these medications are taken for bladder infection or for urine tests.  If your urine has a particular odor or color, consult the pharmacist who filled the doctor's prescription or ask your doctor.               Belly Hard Belly Big with Squatty Potty    Put feet on step stool to increase anorectal angle.  Breathe from Diaphragm, hold intraabdominal pressure (20 seconds) to push feces into rectum without straining. Then open mouth exhale then tilt pelvis back/ forth to have BM.

## 2025-02-04 NOTE — PROGRESS NOTES
Patient: Sherita Gutierres (69 year old, female) Referring Provider:  Insurance:   Diagnosis: Pelvic floor weakness in female (N81.89) Eileen KING   Date of Surgery: n/a Next MD visit:  N/A   Precautions:  Drug Allergy 2/5/2025 Referral Information:    Date of Evaluation: Req: 8, Auth: 8, Exp: 3/22/2025    01/22/25 POC Auth Visits:  8       Today's Date   2/4/2025    Subjective  Pt reports she is trying the bladder emptying techniques but is not sure if she is doing the breathing correctly. BSS #1 with traveling - decreased water intake. Better now with being home. Pt feels she may have an odor to her vulvar region. Upon asking from PT, pt denies using anything inside the vagina to clean & uses only soap & water externally along with some coconut oil.       Pain: 3/10 (can go up 7/10)     Objective   Informed verbal consent for internal pelvic evaluation given: Yes, ongoing consent confirmed with frequent check-ins throughout    External Observation:   Voluntary contraction: present   Voluntary relaxation: present  Involuntary contraction: absent  Involuntary relaxation: present - partial with breathing coordination, unable to perform at rest    Mons pubis: WNL  Labia majora: WNL  Labia minora: WNL  Urethral meatus: WNL  Introitus: WNL  Perineal body: WNL    Sensory/Reflex:  Vestibule: normal bilaterally    Internal Examination   Pelvic Floor Muscle strength: (PERF= Power/Endurance/Reps/Fast) MMT: 2/6/5/5  Accessory Muscle Use: abdominals    Tissue Laxity Test:  Anterior Wall: Min  Posterior Wall: WNL  Apical: WNL    Internal Palpation: WNL except Urethrovaginalis L minimal restriction and tenderness  Pubococcygeus/Pubovaginalis R minimal restriction and pain      Assessment: Pt in agreement to proceed with internal PFM exam today with findings listed above. With cueing pt able to progress from Diaphragmatic Breathing to PFM breathing coordination adding low intensity PFM contraction with exhale  with partial PFM lengthening present with inhale. Will cont to work to improve excursion with both.      Goals (to be met in 8 visits)   Goals       Therapy Goals      Not Met Progress Toward Partially Met Met    [] [] [] []   Patient will demonstrate PFM lengthening WNL with coordinated diaphragmatic breathing x 10 reps to alleviate pain with voiding & promote more WNL void durations as well as more complete bladder emptying. [] [] [] []   Patient will report decreased urinary frequency to 6-8x/day and 0-1x/night indicating normalizing micturition reflex for improved bladder health and function. [] [] [] []   Patient will report ability to postpone urination for at least 15 mins after initial urge presents for improved ability to participate in community outings without fear of UI. [] [] [] []   Patient will report adherence to HEP for continued exercise benefits following cessation of PT. [] [] [] []                   Plan  Assess for update following appt with Dr. Marcum tomorrow    Treatment Last 4 Visits       1/22/2025 2/4/2025   PT Treatment   Treatment Day  2   Neuro Re-Ed Bladder emptying techniques  Diaphragmatic Breathing PFM breathing coordination with internal cueing, instruct for HEP with use of 1 pillow under pelvis for gravity assist and/ or with BLE elevation  Belly hard belly big with squatty potty/ stepstool - avoid straining with defecation   Manual Therapy  Internal PFM exam: Explain mechanism of exam using pelvic model & clinical benefit/ purpose of exam. Advise pain levels should be controlled. Discuss findings with pt including connection to POC.     Additional Treatment  Self-Care:   vulvar hygiene/ skin care - regular pad changes even if not wet - pt is advised to discuss this with Dr. Marcum at appt; bladder health (handouts provided)   Neuro Re-Ed Min 8 15   Manual Therapy Min  15   Other Timed Activity Min  10   Evaluation Min 25    Total of Timed Procedures 8 40   Total of Service  Based 25 0   Total Treatment Time 33 40         HEP  PFM breathing coordination  Belly hard belly big with squatty potty/ stepstool    Charges     Manual X 1, Neuro X 1, Self-Care X 1

## 2025-02-05 ENCOUNTER — OFFICE VISIT (OUTPATIENT)
Dept: UROLOGY | Facility: CLINIC | Age: 70
End: 2025-02-05
Attending: OBSTETRICS & GYNECOLOGY
Payer: MEDICARE

## 2025-02-05 VITALS
BODY MASS INDEX: 24 KG/M2 | SYSTOLIC BLOOD PRESSURE: 118 MMHG | DIASTOLIC BLOOD PRESSURE: 74 MMHG | TEMPERATURE: 99 F | HEIGHT: 66 IN

## 2025-02-05 DIAGNOSIS — N95.2 VAGINAL ATROPHY: ICD-10-CM

## 2025-02-05 DIAGNOSIS — R33.9 INCOMPLETE BLADDER EMPTYING: ICD-10-CM

## 2025-02-05 DIAGNOSIS — N39.41 URGE INCONTINENCE: Primary | ICD-10-CM

## 2025-02-05 DIAGNOSIS — R30.0 DYSURIA: ICD-10-CM

## 2025-02-05 PROBLEM — N81.6 RECTOCELE: Status: RESOLVED | Noted: 2022-09-07 | Resolved: 2025-02-05

## 2025-02-05 PROBLEM — N81.11 CYSTOCELE, MIDLINE: Status: RESOLVED | Noted: 2022-09-07 | Resolved: 2025-02-05

## 2025-02-05 PROBLEM — R39.89 BLADDER PAIN: Status: RESOLVED | Noted: 2023-08-30 | Resolved: 2025-02-05

## 2025-02-05 LAB
CONTROL RUN WITHIN 24 HOURS?: YES
GLUCOSE URINE: 500
LEUKOCYTE ESTERASE URINE: NEGATIVE
NITRITE URINE: NEGATIVE

## 2025-02-05 PROCEDURE — 81002 URINALYSIS NONAUTO W/O SCOPE: CPT | Performed by: OBSTETRICS & GYNECOLOGY

## 2025-02-05 PROCEDURE — 52000 CYSTOURETHROSCOPY: CPT

## 2025-02-05 PROCEDURE — 88108 CYTOPATH CONCENTRATE TECH: CPT | Performed by: OBSTETRICS & GYNECOLOGY

## 2025-02-05 RX ORDER — LIDOCAINE HYDROCHLORIDE 20 MG/ML
10 JELLY TOPICAL ONCE
Status: COMPLETED | OUTPATIENT
Start: 2025-02-05 | End: 2025-02-05

## 2025-02-05 RX ADMIN — LIDOCAINE HYDROCHLORIDE 10 ML: 20 JELLY TOPICAL at 11:48:00

## 2025-02-05 NOTE — PROGRESS NOTES
Sherita Gutierres  3/8/1955  2/28/24    Chief Complaint   Patient presents with    Cystourethroscopy Procedure       HPI:  The patient is a 69 year-old female who is status post TVH, BSO, USLS, APE, sling, cysto on 1/26/23. Surgery was indicated secondary to stage 2 POP, ROBERT. Uncomplicated. She was last seen on 12/18/24.  Using Estrace vaginal cream. Has been having recurrent dysuria. PVR at last visit 120 ml. Recommended pyridium, referral to pelvic floor PT and office cystoscopy.     Cultures as follows:  12/12/24 mixed urogenital luisa   11/21/24 mixed urogenital luisa   11/04/24 >100k E Coli Bactrim x10d     Interval history:  Has started pelvic floor PT.  Would like to continue  She is using Estrace vaginal cream as directed  No improvement in dysuria with Pyridium.   Still reports off/on discomfort when urinating.  Also having more UUI.   No ROBERT.   Bowels are regular.   Not sexually active at this time.   Did not have to take antibiotic prescribed at last visit.     Objective:  /74   Temp 98.6 °F (37 °C)   Ht 66\"   BMI 24.21 kg/m²     General:  Alert and oriented. Well-nourished, normally developed.  Thought and emotional status are appropriate, speech is understandable.  No acute distress.  Pelvic: deferred.     The patient voided in the privacy of the bathroom and collected cytology specimen.  She was catheterized for PVR of 150 ml.     Procedure Note:  After obtaining appropriated consent, procedural pause was performed. The patient was properly positioned and the urethra was cleansed with antiseptic. 2% lidocaine was placed in the urethra for local analgesia.  An Endosee catheter was inserted and the urethra and bladder were inspected with the following findings:  Urethra: Normal, no lesions  Urethrovesical junction: Normal.  Bladder: The bladder was filled by gravity to capacity.  The trigone was normal. The ureteral orifices were normally located and demonstrated normal ejection of urine.   There were no bladder trabeculations, urothelial lesions, stones, cysts or tumor growth.       Impression:  Encounter Diagnoses   Name Primary?    Urge incontinence Yes    Incomplete bladder emptying     Vaginal atrophy     Dysuria          Plan:  Reassured regarding normal cystoscopic findings.   Recommend continuation of pelvic floor PT.   Continue Estrace x 2-3 per week.   Recommend to proceed with urodynamic testing to evaluate for voiding dysfunction.  Patient agrees with plan.    Eileen Marcum MD, FACOG, FACS  Female Pelvic Medicine and  Reconstructive Surgery (Urogynecology)

## 2025-02-10 ENCOUNTER — TELEPHONE (OUTPATIENT)
Dept: UROLOGY | Facility: CLINIC | Age: 70
End: 2025-02-10

## 2025-02-10 ENCOUNTER — OFFICE VISIT (OUTPATIENT)
Dept: PHYSICAL THERAPY | Age: 70
End: 2025-02-10
Attending: OBSTETRICS & GYNECOLOGY
Payer: MEDICARE

## 2025-02-10 LAB — NON GYNE INTERPRETATION: NEGATIVE

## 2025-02-10 PROCEDURE — 97110 THERAPEUTIC EXERCISES: CPT | Performed by: PHYSICAL THERAPIST

## 2025-02-10 NOTE — TELEPHONE ENCOUNTER
JAI Cardona reviewed cytology results. Phoned patient and informed of normal urine cytology results. Patient verbalized an understanding.

## 2025-02-11 ENCOUNTER — APPOINTMENT (OUTPATIENT)
Dept: PHYSICAL THERAPY | Age: 70
End: 2025-02-11
Attending: OBSTETRICS & GYNECOLOGY
Payer: MEDICARE

## 2025-02-11 NOTE — PATIENT INSTRUCTIONS
Access Code: [email or web address]  URL: [email or web address]  Date: 02/10/2025  Prepared by: Naheed Milligan    Exercises  - Standing 'L' Stretch at Counter  - 1 x daily - 7 x weekly - 3 sets - 30 sec hold  - Seated Hamstring Stretch  - 1 x daily - 7 x weekly - 5 sets - 15 sec hold  - Supine Lower Trunk Rotation  - 1 x daily - 7 x weekly - 1 sets - 10 reps - 5 sec hold  - Supine Figure 4 Piriformis Stretch  - 1 x daily - 7 x weekly - 1 sets - 10 reps - 10 sec hold  - Supine Hip Adduction Isometric with Ball  - 1 x daily - 4 x weekly - 2 sets - 10 reps - 5 sec hold  - Hooklying Clamshell with Resistance  - 1 x daily - 4 x weekly - 2 sets - 10 reps - 5 sec hold  - Sidelying Hip Abduction  - 1 x daily - 4 x weekly - 1 sets - 10 reps

## 2025-02-11 NOTE — PROGRESS NOTES
Patient: Sherita Gutierres (69 year old, female) Referring Provider:  Insurance:   Diagnosis: Pelvic floor weakness in female (N81.89) Eileen ROCA [facility]   Date of Surgery: n/a Next MD visit:  N/A   Precautions:  Drug Allergy UDS 2/27/2025 Referral Information:    Date of Evaluation: Req: 8, Auth: 8, Exp: 3/22/2025    01/22/25 POC Auth Visits:  8       Today's Date   2/10/2025    Subjective  Pt reports feeling she is getting better, trying to do the odilia the muscle; wondering if she can do the exercise in sitting; some more tenderness after last visit & then back to back with Dr. Marcum which is better now; cont to have some pain with repeated urinations but once she cleans & re-applies the coconut oil it helps       Pain: 0/10     Objective    Assessment  Able to progress to new strengthening & stretching/ mobility work without increased pain. Challenge with initially preventing compensation with sidelying hip abduction with form improved with cues.    Goals (to be met in 8 visits)   Goals       Therapy Goals      Not Met Progress Toward Partially Met Met    [] [] [] []   Patient will demonstrate PFM lengthening WNL with coordinated diaphragmatic breathing x 10 reps to alleviate pain with voiding & promote more WNL void durations as well as more complete bladder emptying. [] [] [] []   Patient will report decreased urinary frequency to 6-8x/day and 0-1x/night indicating normalizing micturition reflex for improved bladder health and function. [] [] [] []   Patient will report ability to postpone urination for at least 15 mins after initial urge presents for improved ability to participate in community outings without fear of UI. [] [] [] []   Patient will report adherence to HEP for continued exercise benefits following cessation of PT. [] [] [] []                   Plan  Cont with glute work, PFM activation.    Treatment Last 4 Visits       1/22/2025 2/4/2025 2/10/2025   PT Treatment   Treatment  Day  2 3   Therapeutic Exercise   NuStep L3 X 4'  Flat back stretch at // bars 3 X 30\"  Standing hip Add stretch at // bars 3 X 15\" ea  Seated HS stretch 5 X 15\" ea  Hooklying hip Add nae yellow ball 5\" X 20  Hooklying hip Abd nae green pilates ring 5\" X 20  LTR 5\" X 10 ea  Supine modified figure-4 piriformis stretch 10\" X 10 ea  S/L hip Abd 1 X 10 ea   Neuro Re-Ed Bladder emptying techniques  Diaphragmatic Breathing PFM breathing coordination with internal cueing, instruct for HEP with use of 1 pillow under pelvis for gravity assist and/ or with BLE elevation  Belly hard belly big with squatty potty/ stepstool - avoid straining with defecation    Manual Therapy  Internal PFM exam: Explain mechanism of exam using pelvic model & clinical benefit/ purpose of exam. Advise pain levels should be controlled. Discuss findings with pt including connection to POC.      Additional Treatment  Self-Care:   vulvar hygiene/ skin care - regular pad changes even if not wet - pt is advised to discuss this with Dr. Marcum at appt; bladder Spreadknowledge (handouts provided)    Therapeutic Exercise Min   43   Neuro Re-Ed Min 8 15    Manual Therapy Min  15    Other Timed Activity Min  10    Evaluation Min 25     Total of Timed Procedures 8 40 43   Total of Service Based 25 0 0   Total Treatment Time 33 40 43         HEP  Access Code: [email or web address]  URL: [email or web address]  Date: 02/10/2025  Prepared by: Naheed Mliligan    Exercises  - Standing 'L' Stretch at Counter  - 1 x daily - 7 x weekly - 3 sets - 30 sec hold  - Seated Hamstring Stretch  - 1 x daily - 7 x weekly - 5 sets - 15 sec hold  - Supine Lower Trunk Rotation  - 1 x daily - 7 x weekly - 1 sets - 10 reps - 5 sec hold  - Supine Figure 4 Piriformis Stretch  - 1 x daily - 7 x weekly - 1 sets - 10 reps - 10 sec hold  - Supine Hip Adduction Isometric with Ball  - 1 x daily - 4 x weekly - 2 sets - 10 reps - 5 sec hold  - Hooklying Clamshell with Resistance  - 1 x daily - 4 x  weekly - 2 sets - 10 reps - 5 sec hold  - Sidelying Hip Abduction  - 1 x daily - 4 x weekly - 1 sets - 10 reps    Charges     Therex X 3

## 2025-02-18 ENCOUNTER — OFFICE VISIT (OUTPATIENT)
Dept: PHYSICAL THERAPY | Age: 70
End: 2025-02-18
Attending: OBSTETRICS & GYNECOLOGY
Payer: MEDICARE

## 2025-02-18 PROCEDURE — 97110 THERAPEUTIC EXERCISES: CPT | Performed by: PHYSICAL THERAPIST

## 2025-02-18 NOTE — PATIENT INSTRUCTIONS
Access Code: [email or web address]  URL: [email or web address]  Date: 02/18/2025  Prepared by: Naheed Milligan    Exercises  - Seated Child's Pose with Table  - 1 x daily - 7 x weekly - 3 sets - 30 sec hold  - Child's Pose Knees Apart and Hands Forward   - 1 x daily - 7 x weekly - 3 sets - 30 sec hold  - Supine Pelvic Floor Stretch  - 1 x daily - 7 x weekly - 3 sets - 30 sec hold  - Supported Happy Baby with Legs on Chair  - 1 x daily - 7 x weekly - 3 sets - 30 sec hold  
no

## 2025-02-18 NOTE — PROGRESS NOTES
Patient: Sherita Gutierres (69 year old, female) Referring Provider:  Insurance:   Diagnosis: Pelvic floor weakness in female (N81.89) Eileen KING   Date of Surgery: n/a Next MD visit:  N/A   Precautions:  Drug Allergy UDS 2/27/2025 Referral Information:    Date of Evaluation: Req: 8, Auth: 8, Exp: 3/22/2025    01/22/25 POC Auth Visits:  8       Today's Date   2/18/2025    Subjective  pt reports she accidentally left handouts here, tried to do what she remembered; pt reports cont to feel the pain, not sure if the ex's will help the pain       Pain: 0/10     Objective  see flowsheet for details    Assessment  Added in additional pelvic mobility/ stretches to address pt's symptoms. No pain or increased symptoms with new ex's added.    Goals (to be met in 8 visits)   Goals       Therapy Goals      Not Met Progress Toward Partially Met Met    [] [] [] []   Patient will demonstrate PFM lengthening WNL with coordinated diaphragmatic breathing x 10 reps to alleviate pain with voiding & promote more WNL void durations as well as more complete bladder emptying. [] [] [] []   Patient will report decreased urinary frequency to 6-8x/day and 0-1x/night indicating normalizing micturition reflex for improved bladder health and function. [] [] [] []   Patient will report ability to postpone urination for at least 15 mins after initial urge presents for improved ability to participate in community outings without fear of UI. [] [] [] []   Patient will report adherence to HEP for continued exercise benefits following cessation of PT. [] [] [] []                   Plan  Cont with glute work, PFM activation. Pt is aware PT will be out of office from 2/20 & returning 2/26 & will contact central scheduling/ PFN direct line if any issues arise during this time. Next appointment confirmed.    Treatment Last 4 Visits       1/22/2025 2/4/2025 2/10/2025 2/18/2025   PT Treatment   Treatment Day  2 3 4   Therapeutic Exercise    NuStep L3 X 4'  Flat back stretch at // bars 3 X 30\"  Standing hip Add stretch at // bars 3 X 15\" ea  Seated HS stretch 5 X 15\" ea  Hooklying hip Add nae yellow ball 5\" X 20  Hooklying hip Abd nae green pilates ring 5\" X 20  LTR 5\" X 10 ea  Supine modified figure-4 piriformis stretch 10\" X 10 ea  S/L hip Abd 1 X 10 ea NuStep L4 X 4'  Flat back stretch at // bars 3 X 30\"  Standing hip Add stretch at // bars 2 X 30\" ea  Supine butterfly stretch 3 X 30\"  Supine happy baby stretch 3 X 30\" with option for supported position for home (trial with BLE on traction stool)  Prone child's pose stretch 3 X 30\" with option for seated position for home  LTR 5\" X 10 ea  Supine modified figure-4 piriformis stretch 10\" X 10 ea   Neuro Re-Ed Bladder emptying techniques  Diaphragmatic Breathing PFM breathing coordination with internal cueing, instruct for HEP with use of 1 pillow under pelvis for gravity assist and/ or with BLE elevation  Belly hard belly big with squatty potty/ stepstool - avoid straining with defecation     Manual Therapy  Internal PFM exam: Explain mechanism of exam using pelvic model & clinical benefit/ purpose of exam. Advise pain levels should be controlled. Discuss findings with pt including connection to POC.       Additional Treatment  Self-Care:   vulvar hygiene/ skin care - regular pad changes even if not wet - pt is advised to discuss this with Dr. Marcum at appt; bladder health (handouts provided)     Therapeutic Exercise Min   43 40   Neuro Re-Ed Min 8 15     Manual Therapy Min  15     Other Timed Activity Min  10     Evaluation Min 25      Total of Timed Procedures 8 40 43 40   Total of Service Based 25 0 0 0   Total Treatment Time 33 40 43 40         HEP  Access Code: [email or web address]  URL: [email or web address]  Date: 02/18/2025  Prepared by: Naheed Milligan    Exercises  - Seated Child's Pose with Table  - 1 x daily - 7 x weekly - 3 sets - 30 sec hold  - Child's Pose Knees Apart and Hands  Forward   - 1 x daily - 7 x weekly - 3 sets - 30 sec hold  - Supine Pelvic Floor Stretch  - 1 x daily - 7 x weekly - 3 sets - 30 sec hold  - Supported Happy Baby with Legs on Chair  - 1 x daily - 7 x weekly - 3 sets - 30 sec hold    Charges     Therex X 3

## 2025-02-26 ENCOUNTER — OFFICE VISIT (OUTPATIENT)
Dept: PHYSICAL THERAPY | Age: 70
End: 2025-02-26
Attending: OBSTETRICS & GYNECOLOGY
Payer: MEDICARE

## 2025-02-26 ENCOUNTER — TELEPHONE (OUTPATIENT)
Dept: UROLOGY | Facility: CLINIC | Age: 70
End: 2025-02-26

## 2025-02-26 PROCEDURE — 97110 THERAPEUTIC EXERCISES: CPT | Performed by: PHYSICAL THERAPIST

## 2025-02-26 NOTE — PROGRESS NOTES
Patient: Sherita Gutierres (69 year old, female) Referring Provider:  Insurance:   Diagnosis: Pelvic floor weakness in female (N81.89) Eileen ROCA [facility]   Date of Surgery: n/a Next MD visit:  N/A   Precautions:  Drug Allergy UDS 2/27/2025 Referral Information:    Date of Evaluation: Req: 8, Auth: 8, Exp: 3/22/2025    01/22/25 POC Auth Visits:  8       Today's Date   2/26/2025    Subjective  trying to complete ex's- does feel the ex's are working; pt reports was having some pain with urination last couple of days & with bladder filling - feels like it is the urethra; the pain will be pretty much gone after voiding but sometimes is still there a little; the coconut oil does help her, but gets wiped away. urine is clear, does not feel she has a UTI       Pain: 0/10     Objective  see flowsheet for details    Assessment  Pt able to progress with cont glute strengthening today without increased pain. Advised in potential of normal soreness. Pt is encouraged to reach out to Dr. Marcum to see if physician recommends any alternatives to coconut oil.    Goals (to be met in 8 visits)   Goals       Therapy Goals      Not Met Progress Toward Partially Met Met    [] [] [] []   Patient will demonstrate PFM lengthening WNL with coordinated diaphragmatic breathing x 10 reps to alleviate pain with voiding & promote more WNL void durations as well as more complete bladder emptying. [] [] [] []   Patient will report decreased urinary frequency to 6-8x/day and 0-1x/night indicating normalizing micturition reflex for improved bladder health and function. [] [] [] []   Patient will report ability to postpone urination for at least 15 mins after initial urge presents for improved ability to participate in community outings without fear of UI. [] [] [] []   Patient will report adherence to HEP for continued exercise benefits following cessation of PT. [] [] [] []                   Plan  Assess for update after UDS tomorrow. Pt  may wish to wean down visit frequency.    Treatment Last 4 Visits       2/4/2025 2/10/2025 2/18/2025 2/26/2025   PT Treatment   Treatment Day 2 3 4 5   Therapeutic Exercise  NuStep L3 X 4'  Flat back stretch at // bars 3 X 30\"  Standing hip Add stretch at // bars 3 X 15\" ea  Seated HS stretch 5 X 15\" ea  Hooklying hip Add nae yellow ball 5\" X 20  Hooklying hip Abd nae green pilates ring 5\" X 20  LTR 5\" X 10 ea  Supine modified figure-4 piriformis stretch 10\" X 10 ea  S/L hip Abd 1 X 10 ea NuStep L4 X 4'  Flat back stretch at // bars 3 X 30\"  Standing hip Add stretch at // bars 2 X 30\" ea  Supine butterfly stretch 3 X 30\"  Supine happy baby stretch 3 X 30\" with option for supported position for home (trial with BLE on traction stool)  Prone child's pose stretch 3 X 30\" with option for seated position for home  LTR 5\" X 10 ea  Supine modified figure-4 piriformis stretch 10\" X 10 ea Discharge planning  NuStep L5 X 6'  Hooklying hip Abd nae black pilates ring 5\" X 15  Bridge with hip Abd nae black pilates ring 5\" X 15  Clams X 15 ea  Reverse clams X 15 ea  Prone B hip IR active stretch 10\" X 10   Neuro Re-Ed PFM breathing coordination with internal cueing, instruct for HEP with use of 1 pillow under pelvis for gravity assist and/ or with BLE elevation  Belly hard belly big with squatty potty/ stepstool - avoid straining with defecation      Manual Therapy Internal PFM exam: Explain mechanism of exam using pelvic model & clinical benefit/ purpose of exam. Advise pain levels should be controlled. Discuss findings with pt including connection to POC.        Additional Treatment Self-Care:   vulvar hygiene/ skin care - regular pad changes even if not wet - pt is advised to discuss this with Dr. Marcum at appt; bladder health (handouts provided)   Self-Care: discuss with Dr. Marcum re: other possible options for vaginal/ vulvar moisturizers so pt does not need to wear pads (possible urethral irritation)   Therapeutic  Exercise Min  43 40 40   Neuro Re-Ed Min 15      Manual Therapy Min 15      Other Timed Activity Min 10      Total of Timed Procedures 40 43 40 40   Total of Service Based 0 0 0 0   Total Treatment Time 40 43 40 40         HEP  Access Code: [email or web address]  URL: [email or web address]  Date: 02/18/2025  Prepared by: Naheed Milligan    Exercises  - Seated Child's Pose with Table  - 1 x daily - 7 x weekly - 3 sets - 30 sec hold  - Child's Pose Knees Apart and Hands Forward   - 1 x daily - 7 x weekly - 3 sets - 30 sec hold  - Supine Pelvic Floor Stretch  - 1 x daily - 7 x weekly - 3 sets - 30 sec hold  - Supported Happy Baby with Legs on Chair  - 1 x daily - 7 x weekly - 3 sets - 30 sec hold    Charges     Therex X 3

## 2025-02-27 ENCOUNTER — OFFICE VISIT (OUTPATIENT)
Dept: UROLOGY | Facility: CLINIC | Age: 70
End: 2025-02-27
Attending: OBSTETRICS & GYNECOLOGY
Payer: MEDICARE

## 2025-02-27 VITALS
SYSTOLIC BLOOD PRESSURE: 134 MMHG | BODY MASS INDEX: 24 KG/M2 | DIASTOLIC BLOOD PRESSURE: 88 MMHG | RESPIRATION RATE: 16 BRPM | WEIGHT: 150 LBS

## 2025-02-27 DIAGNOSIS — N81.89 PELVIC FLOOR WEAKNESS IN FEMALE: ICD-10-CM

## 2025-02-27 DIAGNOSIS — R39.89 BLADDER PAIN: ICD-10-CM

## 2025-02-27 DIAGNOSIS — R33.9 INCOMPLETE BLADDER EMPTYING: ICD-10-CM

## 2025-02-27 DIAGNOSIS — N39.41 URGE INCONTINENCE: Primary | ICD-10-CM

## 2025-02-27 LAB
CONTROL RUN WITHIN 24 HOURS?: YES
LEUKOCYTE ESTERASE URINE: NEGATIVE
NITRITE URINE: NEGATIVE

## 2025-02-27 PROCEDURE — 51741 ELECTRO-UROFLOWMETRY FIRST: CPT

## 2025-02-27 PROCEDURE — 51784 ANAL/URINARY MUSCLE STUDY: CPT

## 2025-02-27 PROCEDURE — 51797 INTRAABDOMINAL PRESSURE TEST: CPT

## 2025-02-27 PROCEDURE — 87077 CULTURE AEROBIC IDENTIFY: CPT

## 2025-02-27 PROCEDURE — 87186 SC STD MICRODIL/AGAR DIL: CPT

## 2025-02-27 PROCEDURE — 81002 URINALYSIS NONAUTO W/O SCOPE: CPT

## 2025-02-27 PROCEDURE — 51729 CYSTOMETROGRAM W/VP&UP: CPT

## 2025-02-27 PROCEDURE — 87086 URINE CULTURE/COLONY COUNT: CPT

## 2025-02-27 RX ORDER — LIDOCAINE HYDROCHLORIDE 20 MG/ML
10 JELLY TOPICAL ONCE
Status: COMPLETED | OUTPATIENT
Start: 2025-02-27 | End: 2025-02-27

## 2025-02-27 RX ADMIN — LIDOCAINE HYDROCHLORIDE 10 ML: 20 JELLY TOPICAL at 13:38:00

## 2025-02-27 NOTE — PROCEDURES
Patient here for urodynamic testing.  Procedure explained and confirmed by patient.  See evaluation form for results.  Both verbal and written discharge instructions were given.  Patient tolerated procedure well and will follow up with Dr. Eileen Marcum on 3/12/25.    URODYNAMIC EVALUATION    PATIENT HISTORY:    Prolapse:  No  ROBERT:  No  -very  rare and not a bother  - previous sling    UUI:  Yes - urgency / bladder pain  Nocturia:  1 to 2  Frequency:  every 1-2 hours - bladder pain with full bladder  Sense of Incomplete Emptying:  Yes - will reposition  Constipation:  No  Last void prior to UDS testin minutes  Current urge to void?  Strong  OAB meds stopped prior to test?  NA  Other symptoms?  Currently in PFPT - feel this maybe helping with urgency - reports that blood sugars have been higher lately - will be following up with PCP  Using Estrace [facility] and also applying coconut oil to perineum after voiding - feels tissue is irritated after voiding   Reports increase in bladder discomfort this past week - requests that urine culture be sent - Is not currently using Pyridium - feel this has not helped  Surgery?  [x]  No  []  Interested in surgery:   []  Yes, specify date:    Surgical folder provided?  []  Yes  [x]  No     PATIENT DIAGNOSIS:  Urge Incontinence N39.41 Incomplete Bladder Emptying    UDS PROCEDURAL FINDINGS:  Stress Incontinence N39.3 strain to void    MEDICATION: Medications Taking[1]     ALLERGIES:  Amoxicillin      EXAM:  Urinalysis Dip:  Today's Results   Component Date Value    control run 2025 Yes     Blood Urine 2025 Trace (A)     Nitrite Urine 2025 Negative     Leukocyte esterase urine 2025 Negative       Uroves[place]unction ( >30 degrees ):  [x]  Mobile  []  Fixed    Perineal Sensation:  [x]  Normal  []  Abnormal    Additional Notes:    PROLAPSE:  []  Yes  [x]  No  Prolapse reduced for testing?  []  Yes  [x]  No  []  Pessary  []  Manual  []  Half  Speculum    Additional Notes:    UROFLOWMETRY:  Unreduced  Voided Volume:                       417      mL  Maximum Flow Rate:                        31        mL/sec  Average flow rate:                        8     mL/sec  Post-void Residual:                      80     mL  Pattern:  []  Normal  []  Poor flow     [x]  Intermittent  []  Other  Void:   [x]  Typical  []  Atypical    Additional Notes: - reports pain with catheter insertion  - Lidocaine jelly used for urethral lubrication for pves    CYSTOMETRY:  Urethral Catheter:  Fr 7 / tdoc  Abd Catheter:     Fr 7 / tdoc   Infusion:  Water Rate 30 mL/min  Temp:  Room  Position:  [x]  Sit  []  Stand  []  Supine  First sensation:   80 mL  First desire to void:   188 mL  Strong desire to void:  244 mL  Maximum cystometric capacity:   379 mL  Detrusor Activity:  []  Unstable   [x]  Stable  Urge leakage?    []  Yes [x]  No  Volume at 1st unhibited detrusor cont:    mL  Detrusor instability provoked by:    []  Spontaneous []  Coughing  []  Filling  []  Valsalva  []  Other    Additional Notes:      URETHRAL FUNCTION:  Valsava (vesical) Leak Point Pressures:    Volume Leak Point Pressure Leak?    Cough Valsalva      100mL 92 84    cm H2O []  Yes [x]  No   200mL 128 86    cm H2O []  Yes [x]  No   Maximum Cystomatric Capacity  379 ml 120 93 cm H2O [x]  Yes []  No     Genuine Stress Incontinence demonstrated?   [x]  Yes @ capacity 379 ml  []  No    Resting Urethral Pressure Profile:     Functional Urethral Length:manual pull  Maximum UCP:          43 cm        49     cm                  cm    PRESSURE/FLOW STUDY:  Unreduced  Voided volume:      2  ml + 600 BR void = 602  mL  Maximum flow rate:       1 mL/sec  Pressure Detrusor (at maximum flow):      0 ( pves removed)      cm H2O  Post void residual:        80       mL  Voiding mechanism:  [x]  Abnormal  []  Normal  [x]  Strain to void   []  Weak detrusor  Void:   []  Typical   []  Atypical    Additional Notes: Unable to void  with pves in place - removed and few drops of urine with straining pattern in attempt to void with pabd and EMG in place - up to BR for 600 ml void   Uroflowmetry, cystometry, and pressure / flow study were completed using calibrated electronic equipment and air charged transducers.    EMG:  During fill: Reactive    During flow study: Reactive    2/27/2025 2:41 PM     PERFORMED BY:  Sheila JOHNSON RN                 [1]   Outpatient Medications Marked as Taking for the 2/27/25 encounter (Office Visit) with LIS PROCEDURE   Medication Sig Dispense Refill    GLIPIZIDE ER 5 MG Oral Tablet 24 Hr TAKE 1 TABLET BY MOUTH EVERY DAY WITH BREAKFAST 90 tablet 0    estradiol (ESTRACE) 0.1 MG/GM Vaginal Cream Apply 1 gram vaginally 3 times per week. 42.5 g 3    losartan 100 MG Oral Tab Take 1 tablet (100 mg total) by mouth daily. 90 tablet 0    metFORMIN  MG Oral Tablet 24 Hr Take 2 tablets (1,000 mg total) by mouth daily. 180 tablet 3    MULTI-VITAMIN/MINERALS OR TABS Take 1 tablet by mouth daily.

## 2025-02-27 NOTE — PATIENT INSTRUCTIONS
[facility] UROGYNECOLOGY  [address] [address]  PH: [phone]  FAX: [phone]       Urodynamic Testing Discharge Instructions:    There are NO dietary or activity restrictions.  You may resume your normal schedule.      You may have mild discomfort for a few hours after your testing today.  There may be some mild burning when you urinate or you may see some blood in your urine.  These problems should not last more than 24 hours.  The following suggestions may minimize any symptoms you experience.    Drink 6-8 large glasses of water over the next 8 hours  A compress or sitz bath may be soothing  Tylenol or Ibuprofen may be taken as needed    If you experience any of the following, please call the office or, if after hours, the on-call physician at [phone].    Excessive pain  Bright red bloody discharge  Fever or chills  Continued urgency, frequency or burning with urination    Obtaining Test Results    Your urodynamic test will be interpreted by a specialist and available to the referring physician within 7-14 days.  Patients in our clinic are given an appointment to come back to discuss the results and any appropriate treatment recommendations.    Please do not hesitate to contact our office with any questions or concerns at [phone].    I acknowledge that I have received verbal and written discharge  instructions and that I understand these instructions clearly.    Patient Signature:    Date:

## 2025-03-04 ENCOUNTER — OFFICE VISIT (OUTPATIENT)
Dept: PHYSICAL THERAPY | Age: 70
End: 2025-03-04
Attending: OBSTETRICS & GYNECOLOGY
Payer: MEDICARE

## 2025-03-04 ENCOUNTER — TELEPHONE (OUTPATIENT)
Dept: UROLOGY | Facility: CLINIC | Age: 70
End: 2025-03-04

## 2025-03-04 PROCEDURE — 97112 NEUROMUSCULAR REEDUCATION: CPT | Performed by: PHYSICAL THERAPIST

## 2025-03-04 PROCEDURE — 97110 THERAPEUTIC EXERCISES: CPT | Performed by: PHYSICAL THERAPIST

## 2025-03-04 RX ORDER — NITROFURANTOIN 25; 75 MG/1; MG/1
100 CAPSULE ORAL 2 TIMES DAILY
Qty: 14 CAPSULE | Refills: 0 | Status: SHIPPED | OUTPATIENT
Start: 2025-03-04

## 2025-03-04 NOTE — PATIENT INSTRUCTIONS
Access Code: [email or web address]  URL: [email or web address]  Date: 03/04/2025  Prepared by: Naheed Milligan    Exercises  - Clamshell  - 1 x daily - 4 x weekly - 1-2 sets - 15 reps  - Sidelying Reverse Clamshell  - 1 x daily - 4 x weekly - 1-2 sets - 15 reps  - Prone Hip Internal Rotation AROM  - 1 x daily - 6 x weekly - 1 sets - 10 reps - 10 sec hold

## 2025-03-04 NOTE — PROGRESS NOTES
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 Marcum follow up for UDS: 3/12/2025 Referral Information:    Date of Evaluation: Req: 8, Auth: 8, Exp: 3/22/2025    01/22/25 POC Auth Visits:  8        Discharge Summary  Pt has attended 6 visits in Physical Therapy.     Today's Date   3/4/2025    Subjective  UDS completed; feels she would like to stop PT after today, feels she has a lot of exercises to continue with on her own       Pain: 0/10     Objective  see flowsheet for details      Today:  PFDI 20    Scores   POPDI 6:    33.33   CRAD 8:    28.13   WILLIAM 6:    45.83   Summary:    107.29        Intake:  PFDI 20    Scores   POPDI 6:    33.33   CRAD 8:    34.38   WILLIAM 6:    62.5   Summary:    130.21       Informed verbal consent for internal pelvic evaluation given: Yes, ongoing consent confirmed with frequent check-ins throughout    External Observation:   Voluntary contraction: present at rest & with breathing coordination  Voluntary relaxation: present  Involuntary relaxation: present at rest & with breathing coordination    Mons pubis: WNL  Labia majora: WNL  Labia minora: WNL  Urethral meatus: WNL  Introitus: WNL  Perineal body: WNL    Sensory/Reflex:  Vestibule: normal bilaterally    Internal Examination   Pelvic Floor Muscle strength: (PERF= Power/Endurance/Reps/Fast) MMT: 3/10/3/5  Accessory Muscle Use: none    Internal Palpation: WNL except Pubococcygeus/Pubovaginalis R/L tenderness       Assessment  Pt is self-discharging from PFPT. Improved power & endurance of PFM strength noted. Pt advised to contact PT if questions/ concerns arise while performing HEP. Pt verbalizes understanding w/ HEP & ability to self-alter as appropriate. Pt advised to follow up with referring provider if symptoms increase despite adherence to  HEP. Pt is aware that if symptoms recur or increase, pt may be appropriate to return to skilled PT under new POC w/ updated RX if deemed medically necessary. Pt is in agreement with discharge plans. Thank you.      Goals (to be met in 8 visits)    Goals       Therapy Goals      Not Met Progress Toward Partially Met Met   Patient will demonstrate PFM lengthening WNL with coordinated diaphragmatic breathing x 10 reps to alleviate pain with voiding & promote more WNL void durations as well as more complete bladder emptying. [] [] [x] []   Patient will report decreased urinary frequency to 6-8x/day and 0-1x/night indicating normalizing micturition reflex for improved bladder health and function. [] [x] [] []   Patient will report ability to postpone urination for at least 15 mins after initial urge presents for improved ability to participate in community outings without fear of UI. [] [x] [] []   Patient will report adherence to HEP for continued exercise benefits following cessation of PT. [] [] [] [x]               Plan  Discharge to HEP.    Treatment Last 4 Visits       2/10/2025 2/18/2025 2/26/2025 3/4/2025   PT Treatment   Treatment Day 3 4 5 6   Therapeutic Exercise NuStep L3 X 4'  Flat back stretch at // bars 3 X 30\"  Standing hip Add stretch at // bars 3 X 15\" ea  Seated HS stretch 5 X 15\" ea  Hooklying hip Add nae yellow ball 5\" X 20  Hooklying hip Abd nae green pilates ring 5\" X 20  LTR 5\" X 10 ea  Supine modified figure-4 piriformis stretch 10\" X 10 ea  S/L hip Abd 1 X 10 ea NuStep L4 X 4'  Flat back stretch at // bars 3 X 30\"  Standing hip Add stretch at // bars 2 X 30\" ea  Supine butterfly stretch 3 X 30\"  Supine happy baby stretch 3 X 30\" with option for supported position for home (trial with BLE on traction stool)  Prone child's pose stretch 3 X 30\" with option for seated position for home  LTR 5\" X 10 ea  Supine modified figure-4 piriformis stretch 10\" X 10 ea Discharge planning  NuStep L5 X  6'  Hooklying hip Abd nae black pilates ring 5\" X 15  Bridge with hip Abd nae black pilates ring 5\" X 15  Clams X 15 ea  Reverse clams X 15 ea  Prone B hip IR active stretch 10\" X 10    Additional Treatment   Self-Care: discuss with Dr. Marcum re: other possible options for vaginal/ vulvar moisturizers so pt does not need to wear pads (possible urethral irritation)    Therapeutic Exercise Min 43 40 40    Total of Timed Procedures 43 40 40    Total of Service Based 0 0 0    Total Treatment Time 43 40 40           3/4/2025   Pelvic Treatment   Therapeutic Exercise HEP update/ review  Re-assessment including goal status completion, PFDI-20 survey with education in results  D/C instructions   Neuro Re-Education Internal PFM re-check  -PERF, coordination: see above   Therapeutic Exercise Minutes 28   Neuro Re-Educ Minutes 12   Total Time Of Timed Procedures 40   Total Time Of Service-Based Procedures 0   Total Treatment Time 40   HEP Access Code: [email or web address]  URL: [email or web address]  Date: 03/04/2025  Prepared by: Naheed Milligan    Exercises  - Clamshell  - 1 x daily - 4 x weekly - 1-2 sets - 15 reps  - Sidelying Reverse Clamshell  - 1 x daily - 4 x weekly - 1-2 sets - 15 reps  - Prone Hip Internal Rotation AROM  - 1 x daily - 6 x weekly - 1 sets - 10 reps - 10 sec hold        HEP  Access Code: [email or web address]  URL: [email or web address]  Date: 03/04/2025  Prepared by: Naheed Milligan    Exercises  - Clamshell  - 1 x daily - 4 x weekly - 1-2 sets - 15 reps  - Sidelying Reverse Clamshell  - 1 x daily - 4 x weekly - 1-2 sets - 15 reps  - Prone Hip Internal Rotation AROM  - 1 x daily - 6 x weekly - 1 sets - 10 reps - 10 sec hold    Charges  TE X 2, NMR X 1

## 2025-03-04 NOTE — TELEPHONE ENCOUNTER
TC to pt w/ ucx results.  LM on VM advising ucx +  Per Dr Marcum- mg PO bid x 7 days   Request call back to verify message.

## 2025-03-11 ENCOUNTER — APPOINTMENT (OUTPATIENT)
Dept: PHYSICAL THERAPY | Age: 70
End: 2025-03-11
Attending: OBSTETRICS & GYNECOLOGY
Payer: MEDICARE

## 2025-03-12 ENCOUNTER — OFFICE VISIT (OUTPATIENT)
Dept: UROLOGY | Facility: CLINIC | Age: 70
End: 2025-03-12
Attending: OBSTETRICS & GYNECOLOGY
Payer: MEDICARE

## 2025-03-12 VITALS — RESPIRATION RATE: 18 BRPM | BODY MASS INDEX: 24.11 KG/M2 | WEIGHT: 150 LBS | HEIGHT: 66 IN

## 2025-03-12 DIAGNOSIS — N95.2 VAGINAL ATROPHY: ICD-10-CM

## 2025-03-12 DIAGNOSIS — Z87.440 HISTORY OF RECURRENT UTIS: ICD-10-CM

## 2025-03-12 DIAGNOSIS — N39.41 URGE INCONTINENCE: Primary | ICD-10-CM

## 2025-03-12 PROBLEM — N39.3 URINARY, INCONTINENCE, STRESS FEMALE: Status: RESOLVED | Noted: 2022-11-30 | Resolved: 2025-03-12

## 2025-03-12 PROBLEM — R33.9 INCOMPLETE BLADDER EMPTYING: Status: RESOLVED | Noted: 2024-12-18 | Resolved: 2025-03-12

## 2025-03-12 PROBLEM — N81.2 INCOMPLETE UTEROVAGINAL PROLAPSE: Status: RESOLVED | Noted: 2022-09-07 | Resolved: 2025-03-12

## 2025-03-12 LAB
CONTROL RUN WITHIN 24 HOURS?: YES
LEUKOCYTE ESTERASE URINE: NEGATIVE
NITRITE URINE: NEGATIVE

## 2025-03-12 PROCEDURE — 81002 URINALYSIS NONAUTO W/O SCOPE: CPT | Performed by: OBSTETRICS & GYNECOLOGY

## 2025-03-12 PROCEDURE — 99212 OFFICE O/P EST SF 10 MIN: CPT

## 2025-03-12 RX ORDER — NITROFURANTOIN MACROCRYSTALS 50 MG/1
50 CAPSULE ORAL NIGHTLY
Qty: 90 CAPSULE | Refills: 0 | Status: SHIPPED | OUTPATIENT
Start: 2025-03-12

## 2025-03-12 NOTE — PROGRESS NOTES
Sherita Gutierres  : 3/8/1955  Date: 3/14/25    Chief Complaint   Patient presents with    UDS Follow-up       HPI:  The patient is a 69 year-old female who was last seen on 24. She is status post TVH, BSO, USLS, APE, sling, cysto on 23. Surgery was indicated secondary to stage 2 POP, ROBERT. Uncomplicated. Using Estrace vaginal cream. Has been having recurrent dysuria. PVR at last visit 120 ml. Recommended pyridium and referral to pelvic floor PT.  Had normal office cystoscopy on at last visit. Recommended to proceed with repeat UDS. Returns for follow up.     Cultures as follows:  24 mixed urogenital luisa   24 mixed urogenital luisa   24 >100k E Coli Bactrim x10d   25->100K enterococcus-Tx NTF x 7d    Interval history:  Urodynamic testing undergone without complication on 25.  Results reviewed with patient  417 ml void (80 ml PVR)  379 ml capacity  Stable detrusor  + ROBERT with cough at capacity. No leak with Valsalva.   Pressure/flow study: Voided 602 ml with PVR of 80 ml (unable to void with catheters in place).     Had + urine culture the day she came in for UDS. Just completed antibiotics.   Still doing PT  Still using estrace vaginal cream and coconut oil.   Reports UUI  No ROBERT    Objective:  Resp 18   Ht 66\"   Wt 150 lb (68 kg)   BMI 24.21 kg/m²     General:  Alert and oriented. Well-nourished, normally developed.  Thought and emotional status are appropriate, speech is understandable.  No acute distress.  Pelvic: deferred.     Impression:  Encounter Diagnoses   Name Primary?    Urge incontinence Yes    Vaginal atrophy     History of recurrent UTIs        Plan:  Reassured regarding findings on UDS  Recommend continuation of pelvic floor PT.   Continue Estrace x 2-3 per week.   Agrees to stay on low dose antibiotic suppression with Macrodantin 50 mg PO at bedtime.   Follow up in 3 months or sooner prn.     Eileen Marcum MD, FACOG, FACS  Female Pelvic Medicine  and  Reconstructive Surgery (Urogynecology)

## 2025-03-18 ENCOUNTER — APPOINTMENT (OUTPATIENT)
Dept: PHYSICAL THERAPY | Age: 70
End: 2025-03-18
Attending: OBSTETRICS & GYNECOLOGY
Payer: MEDICARE

## 2025-03-25 ENCOUNTER — APPOINTMENT (OUTPATIENT)
Dept: PHYSICAL THERAPY | Age: 70
End: 2025-03-25
Attending: OBSTETRICS & GYNECOLOGY
Payer: MEDICARE

## 2025-04-28 ENCOUNTER — HOSPITAL ENCOUNTER (OUTPATIENT)
Dept: MAMMOGRAPHY | Facility: HOSPITAL | Age: 70
Discharge: HOME | End: 2025-04-28
Attending: INTERNAL MEDICINE
Payer: MEDICARE

## 2025-04-28 DIAGNOSIS — Z12.31 ENCOUNTER FOR SCREENING MAMMOGRAM FOR MALIGNANT NEOPLASM OF BREAST: ICD-10-CM

## 2025-04-28 PROCEDURE — 77067 SCR MAMMO BI INCL CAD: CPT | Performed by: INTERNAL MEDICINE

## 2025-04-28 PROCEDURE — 77063 BREAST TOMOSYNTHESIS BI: CPT | Performed by: INTERNAL MEDICINE

## 2025-05-29 NOTE — OPERATIVE REPORT
Daniella Ramirez  : 3/8/1955  MRN: [id number]  Date of Surgery: 2023           Pre-operative diagnosis:  1. Incomplete uterovaginal prolapse, cystocele, rectocele. 2. Stress urinary incontinence. Post-operative diagnosis:  1. Incomplete uterovaginal prolapse, cystocele, rectocele, enterocele  2. Stress urinary incontinence. 3. Uterine fibroids. Procedures: Total vaginal hysterectomy with bilateral salpingo ophorectomy, repair of enterocele; uterosacral ligament suspension; anterior colporrhaphy; posterior colporrhaphy; retropubic midurethral sling, cystoscopy. Surgeon:  Favian Grayson MD     Assistant:  Damien Grimes CSA    Location: [place] [address]     Anesthesia:  General endotracheal     Estimated blood loss:  976 ml      Complications: None    Drains: Transurethral Almendarez catheter     Specimens: Uterus, cervix, right and left tubes and ovaries. Findings: Large uterine fibroids. Stage 2 uterovaginal prolapse with associated cystocele, rectocele and enterocele. Bilateral ureteral efflux. No evidence of bladder lesions. Hemostasis upon completion of the case. Description of the Procedure: The patient was taken to operating room 6. General anesthesia was obtained without difficulty. The patient was then placed in the dorsal lithotomy position in [facility]. Surgical time out was performed. She was then prepped and draped in the usual sterile fashion. The Magrina vaginal retractor was utilized and retraction was placed into the vagina. The cervix was grasped with tenacula a circumferential incision was made around the cervix. The anterior and posterior cul-de-sacs were entered sharply and the uterosacral and cardinal ligament complexes, and uterine vascular pedicles were clamped, cut and suture ligated bilaterally. There were multiple uterine fibroids and myomectomies had to be performed to be able to complete the hysterectomy. The largest fibroid measured 5 cm. You have been given a copy of the American Heart Association's Heart Failure Educational Booklet.  Please read over this booklet and take it with you to your next physician appointment with any questions you may have.     For additional resources and to access the American Heart Association's Interactive Workbook \"Healthier Living with Heart Failure - Managing Symptoms and Reducing Risk,\" please scan the QR code below.               Download the Heart Failure [place] Chichi: Search in your Google Play Store ([employer]ido-Academics) or [employer] Chichi Store (MileIQ): Search for- HF [place] Chichi.    https://www.heart.org/en/health-topics/heart-failure/heart-failure-tools-resources/pj-ronlny-jcc    HF [place] is a brand-new phone chichi that helps you track daily symptoms, vitals, mood, energy level and more. You can even add your heart failure care team members to view your data and monitor your condition at home.    HF [place] Lets You:  Track symptoms, medications and more  Share health information with your health care team  Connect with others living with heart failure         The uterus was delivered and the uteroovarian complexes were clamped and cut, and suture ligated, freeing the specimen. The adnexa were identified bilaterally. Per preoperative discussion, a bilateral salpingo-ophorectomy was performed after doubly ligating the gonadal vessels. Right and left tubes and ovaries were submitted to pathology along with the uterus, cervix and fibroids. Hemostasis was confirmed on all pedicles. Due to the presence of significant apical prolapse and associated enterocele, it was necessary to proceed with suspension of the vaginal apex and enterocele repair. The bowel was packed away. The uterosacral ligaments were identified. A Russell culdoplasty was performed first. Utilizing 1-0 Vicryl suture, a Russell stitch was placed through the posterior vaginal wall, into the distal left uterosacral ligament, across the posterior cul-de-sac peritoneum, through the distal right uterosacral ligament and back through the posterior vagina in order to repair the enterocele. This suture was held in tension. Following this, two uterosacral suspension stitches were then placed using 1-0 Vicryl sutures on each side of the pelvis. These were placed high on the uterosacral ligaments at and just proximal to the ischial spine. Once these sutures were placed and held in tension, cystoscopy was performed with a 70-degree scope. The patient had been given 200 mg or oral Pyridium preoperatively. Cystoscopy confirmed that there had been no injury to the bladder. There was prompt flow of orange-stained urine for both ureteral orifices, confirming ureteral patency. Following this, Allis clamps were used to grasp the redundant anterior vaginal epithelium in the midline and a midline incision was made after infiltration of diluted Marcaine with epinephrine.   The redundant epithelium was dissected from the underlying endopelvic connective tissue of the bladder and 0 Vicryl sutures were then used to plicate the endopelvic connective tissue, obliterating the cystocele. The excess vaginal epithelium was excised and the midline incision was then closed with 2-0 Vicryl suture in running locking fashion. The lap sponge was removed from the peritoneal cavity and the vaginal apex was then closed with 2-0 Vicryl suture in a running locking fashion. The uterosacral and Russell sutures were then tied down resulting in excellent elevation of the vaginal apex and correction of the enterocele. Allis clamps were then used to grasp the vaginal epithelium suburethrally and a scalpel was used to make a separate 2 cm midline incision after infiltration of diluted Marcaine with epinephrine. The vaginal epithelium was dissected off the underlying periurethral tissue. The Advantage fit trocar was passed from the vaginal side to the retropubic side bilaterally. Exit markings had been made on the skin previously. Cystoscopy was repeated. Ureteral flow was confirmed again. There was no evidence of intravesical lesion, foreign body or bladder perforation. The sling was then placed in the midurethra in a tension free fashion. There was no evidence of tunneling of the vaginal mucosa. The excess sling was trimmed, and the vaginal mucosa was closed in the midline with running 2-0 Vicryl suture. Skin incisions were reaproximated with Dermabond. Attention was then directed to the posterior compartment. The redundant posterior vaginal epithelium was excised after infiltration of Marcaine with epinephrine. Then utilizing interrupted No. 0 Vicryl; the endopelvic fibromuscular tissue was plicated in the midline in the standard fashion. The vaginal epithelium was reaproximated with 2-0 Vicryl in a running fashion. She had a well-supported perineal body. Examination at this point revealed a well-supported vaginal apex, anterior and posterior compartments, with good hemostasis. Rectal examination confirmed no injury to the rectum.      A transurethral Almendarez catheter was left in place. The patient was then removed from the dorsal lithotomy position, awakened and extubated and transferred from the operating room to the recovery room in stable condition, having tolerated the procedure well. Sponge, lap, & needle counts were correct.      Erica Motley MD

## 2025-06-10 DIAGNOSIS — N95.2 VAGINAL ATROPHY: ICD-10-CM

## 2025-06-10 RX ORDER — ESTRADIOL 0.1 MG/G
1 CREAM VAGINAL
Qty: 42.5 G | Refills: 3 | Status: SHIPPED | OUTPATIENT
Start: 2025-06-11

## 2025-07-30 ENCOUNTER — OFFICE VISIT (OUTPATIENT)
Dept: UROLOGY | Facility: CLINIC | Age: 70
End: 2025-07-30
Attending: OBSTETRICS & GYNECOLOGY
Payer: MEDICARE

## 2025-07-30 VITALS — DIASTOLIC BLOOD PRESSURE: 67 MMHG | SYSTOLIC BLOOD PRESSURE: 136 MMHG | HEART RATE: 67 BPM | TEMPERATURE: 98 F

## 2025-07-30 DIAGNOSIS — R39.15 URGENCY OF MICTURITION: Primary | ICD-10-CM

## 2025-07-30 DIAGNOSIS — N81.89 PELVIC FLOOR WEAKNESS IN FEMALE: ICD-10-CM

## 2025-07-30 DIAGNOSIS — N95.2 VAGINAL ATROPHY: ICD-10-CM

## 2025-07-30 DIAGNOSIS — N39.41 URGE INCONTINENCE: ICD-10-CM

## 2025-07-30 PROBLEM — Z87.440 HISTORY OF RECURRENT UTIS: Status: RESOLVED | Noted: 2025-03-12 | Resolved: 2025-07-30

## 2025-07-30 PROCEDURE — 87086 URINE CULTURE/COLONY COUNT: CPT | Performed by: OBSTETRICS & GYNECOLOGY

## 2025-07-30 PROCEDURE — 99212 OFFICE O/P EST SF 10 MIN: CPT

## 2025-07-30 PROCEDURE — 81002 URINALYSIS NONAUTO W/O SCOPE: CPT | Performed by: OBSTETRICS & GYNECOLOGY

## (undated) DEVICE — STERILE WATER 1000ML BTL

## (undated) DEVICE — CLOSURE EXOFIN 1.0ML

## (undated) DEVICE — LAPAROTOMY SPONGE - RF AND X-RAY DETECTABLE PRE-WASHED: Brand: SITUATE

## (undated) DEVICE — STANDARD HYPODERMIC NEEDLE,POLYPROPYLENE HUB: Brand: MONOJECT

## (undated) DEVICE — VIOLET BRAIDED (POLYGLACTIN 910), SYNTHETIC ABSORBABLE SUTURE: Brand: COATED VICRYL

## (undated) DEVICE — TUBING CYSTO

## (undated) DEVICE — SUT VICRYL 2-0 CT-1 J945H

## (undated) DEVICE — SYRINGE BULB 50/CS 48/PLT: Brand: MEDEGEN MEDICAL PRODUCTS, LLC

## (undated) DEVICE — SLEEVE KENDALL SCD EXPRESS MED

## (undated) DEVICE — SUT VICRYL 0 CT-1 JJ31G

## (undated) DEVICE — PENCIL TELESCOPE MEGADYNE SE

## (undated) DEVICE — GYN CDS: Brand: MEDLINE INDUSTRIES, INC.

## (undated) DEVICE — RETRACTOR LONE STAR STAYS LG

## (undated) DEVICE — CATH BARDEX IC FOLEY 16FR 5CC

## (undated) DEVICE — MEDI-VAC NON-CONDUCTIVE SUCTION TUBING: Brand: CARDINAL HEALTH

## (undated) DEVICE — STERILE POLYISOPRENE POWDER-FREE SURGICAL GLOVES: Brand: PROTEXIS

## (undated) DEVICE — BAG DRAIN INFECTION CNTRL 2000

## (undated) DEVICE — SUT VICRYL 0 CT-1 J470D

## (undated) DEVICE — Device

## (undated) DEVICE — SOL H2O IRRIGATION 3000ML

## (undated) DEVICE — SUT VICRYL 2-0 CT-1 J345H

## (undated) DEVICE — #15 STERILE STAINLESS BLADE: Brand: STERILE STAINLESS BLADES

## (undated) DEVICE — PREMIUM WET SKIN PREP TRAY: Brand: MEDLINE INDUSTRIES, INC.

## (undated) DEVICE — #10 STERILE BLADE: Brand: POLYMER COATED BLADES

## (undated) DEVICE — COVER,MAYO STAND,STERILE: Brand: MEDLINE